# Patient Record
Sex: MALE | Race: ASIAN | NOT HISPANIC OR LATINO | URBAN - METROPOLITAN AREA
[De-identification: names, ages, dates, MRNs, and addresses within clinical notes are randomized per-mention and may not be internally consistent; named-entity substitution may affect disease eponyms.]

---

## 2019-01-01 ENCOUNTER — INPATIENT (INPATIENT)
Facility: HOSPITAL | Age: 84
LOS: 4 days | Discharge: HOME | DRG: 377 | End: 2019-10-01
Attending: FAMILY MEDICINE | Admitting: FAMILY MEDICINE
Payer: MEDICARE

## 2019-01-01 ENCOUNTER — INPATIENT (INPATIENT)
Facility: HOSPITAL | Age: 84
LOS: 3 days | DRG: 951 | End: 2019-10-05
Attending: FAMILY MEDICINE | Admitting: FAMILY MEDICINE
Payer: OTHER MISCELLANEOUS

## 2019-01-01 VITALS
OXYGEN SATURATION: 88 % | RESPIRATION RATE: 20 BRPM | WEIGHT: 119.93 LBS | HEART RATE: 114 BPM | SYSTOLIC BLOOD PRESSURE: 147 MMHG | HEIGHT: 67 IN | DIASTOLIC BLOOD PRESSURE: 78 MMHG

## 2019-01-01 VITALS
RESPIRATION RATE: 16 BRPM | SYSTOLIC BLOOD PRESSURE: 110 MMHG | DIASTOLIC BLOOD PRESSURE: 55 MMHG | TEMPERATURE: 98 F | HEART RATE: 113 BPM | OXYGEN SATURATION: 97 %

## 2019-01-01 VITALS
OXYGEN SATURATION: 100 % | TEMPERATURE: 98 F | SYSTOLIC BLOOD PRESSURE: 159 MMHG | RESPIRATION RATE: 17 BRPM | DIASTOLIC BLOOD PRESSURE: 70 MMHG | HEART RATE: 59 BPM

## 2019-01-01 VITALS
SYSTOLIC BLOOD PRESSURE: 153 MMHG | RESPIRATION RATE: 16 BRPM | OXYGEN SATURATION: 94 % | DIASTOLIC BLOOD PRESSURE: 69 MMHG | HEART RATE: 76 BPM | TEMPERATURE: 98 F

## 2019-01-01 DIAGNOSIS — R06.03 ACUTE RESPIRATORY DISTRESS: ICD-10-CM

## 2019-01-01 DIAGNOSIS — Z51.5 ENCOUNTER FOR PALLIATIVE CARE: ICD-10-CM

## 2019-01-01 DIAGNOSIS — K11.7 DISTURBANCES OF SALIVARY SECRETION: ICD-10-CM

## 2019-01-01 DIAGNOSIS — Z95.0 PRESENCE OF CARDIAC PACEMAKER: ICD-10-CM

## 2019-01-01 DIAGNOSIS — Z71.89 OTHER SPECIFIED COUNSELING: ICD-10-CM

## 2019-01-01 DIAGNOSIS — R53.2 FUNCTIONAL QUADRIPLEGIA: ICD-10-CM

## 2019-01-01 DIAGNOSIS — K92.2 GASTROINTESTINAL HEMORRHAGE, UNSPECIFIED: ICD-10-CM

## 2019-01-01 DIAGNOSIS — R74.8 ABNORMAL LEVELS OF OTHER SERUM ENZYMES: ICD-10-CM

## 2019-01-01 DIAGNOSIS — A41.9 SEPSIS, UNSPECIFIED ORGANISM: ICD-10-CM

## 2019-01-01 DIAGNOSIS — Z29.9 ENCOUNTER FOR PROPHYLACTIC MEASURES, UNSPECIFIED: ICD-10-CM

## 2019-01-01 DIAGNOSIS — R52 PAIN, UNSPECIFIED: ICD-10-CM

## 2019-01-01 DIAGNOSIS — I27.20 PULMONARY HYPERTENSION, UNSPECIFIED: ICD-10-CM

## 2019-01-01 DIAGNOSIS — E43 UNSPECIFIED SEVERE PROTEIN-CALORIE MALNUTRITION: ICD-10-CM

## 2019-01-01 DIAGNOSIS — R53.81 OTHER MALAISE: ICD-10-CM

## 2019-01-01 DIAGNOSIS — N17.9 ACUTE KIDNEY FAILURE, UNSPECIFIED: ICD-10-CM

## 2019-01-01 DIAGNOSIS — I27.21 SECONDARY PULMONARY ARTERIAL HYPERTENSION: ICD-10-CM

## 2019-01-01 DIAGNOSIS — G50.0 TRIGEMINAL NEURALGIA: ICD-10-CM

## 2019-01-01 DIAGNOSIS — D64.9 ANEMIA, UNSPECIFIED: ICD-10-CM

## 2019-01-01 LAB
ABO RH CONFIRMATION: SIGNIFICANT CHANGE UP
ACANTHOCYTES BLD QL SMEAR: PRESENT — SIGNIFICANT CHANGE UP
ALBUMIN SERPL ELPH-MCNC: 2.2 G/DL — LOW (ref 3.5–5)
ALBUMIN SERPL ELPH-MCNC: 2.8 G/DL — LOW (ref 3.5–5)
ALP SERPL-CCNC: 67 U/L — SIGNIFICANT CHANGE UP (ref 40–120)
ALP SERPL-CCNC: 81 U/L — SIGNIFICANT CHANGE UP (ref 40–120)
ALP SERPL-CCNC: 82 U/L — SIGNIFICANT CHANGE UP (ref 40–120)
ALP SERPL-CCNC: 86 U/L — SIGNIFICANT CHANGE UP (ref 40–120)
ALP SERPL-CCNC: 87 U/L — SIGNIFICANT CHANGE UP (ref 40–120)
ALT FLD-CCNC: 19 U/L DA — SIGNIFICANT CHANGE UP (ref 10–60)
ALT FLD-CCNC: 25 U/L DA — SIGNIFICANT CHANGE UP (ref 10–60)
ALT FLD-CCNC: 26 U/L DA — SIGNIFICANT CHANGE UP (ref 10–60)
ALT FLD-CCNC: 29 U/L DA — SIGNIFICANT CHANGE UP (ref 10–60)
ALT FLD-CCNC: 33 U/L DA — SIGNIFICANT CHANGE UP (ref 10–60)
ANION GAP SERPL CALC-SCNC: 12 MMOL/L — SIGNIFICANT CHANGE UP (ref 5–17)
ANION GAP SERPL CALC-SCNC: 12 MMOL/L — SIGNIFICANT CHANGE UP (ref 5–17)
ANION GAP SERPL CALC-SCNC: 13 MMOL/L — SIGNIFICANT CHANGE UP (ref 5–17)
ANION GAP SERPL CALC-SCNC: 13 MMOL/L — SIGNIFICANT CHANGE UP (ref 5–17)
ANION GAP SERPL CALC-SCNC: 7 MMOL/L — SIGNIFICANT CHANGE UP (ref 5–17)
ANION GAP SERPL CALC-SCNC: 7 MMOL/L — SIGNIFICANT CHANGE UP (ref 5–17)
ANISOCYTOSIS BLD QL: SLIGHT — SIGNIFICANT CHANGE UP
APPEARANCE UR: CLEAR — SIGNIFICANT CHANGE UP
APPEARANCE UR: CLEAR — SIGNIFICANT CHANGE UP
AST SERPL-CCNC: 31 U/L — SIGNIFICANT CHANGE UP (ref 10–40)
AST SERPL-CCNC: 33 U/L — SIGNIFICANT CHANGE UP (ref 10–40)
AST SERPL-CCNC: 34 U/L — SIGNIFICANT CHANGE UP (ref 10–40)
AST SERPL-CCNC: 48 U/L — HIGH (ref 10–40)
AST SERPL-CCNC: 53 U/L — HIGH (ref 10–40)
BASE EXCESS BLDV CALC-SCNC: -8.1 MMOL/L — LOW (ref -2–2)
BASOPHILS # BLD AUTO: 0 K/UL — SIGNIFICANT CHANGE UP (ref 0–0.2)
BASOPHILS # BLD AUTO: 0.01 K/UL — SIGNIFICANT CHANGE UP (ref 0–0.2)
BASOPHILS NFR BLD AUTO: 0 % — SIGNIFICANT CHANGE UP (ref 0–2)
BASOPHILS NFR BLD AUTO: 0.1 % — SIGNIFICANT CHANGE UP (ref 0–2)
BILIRUB SERPL-MCNC: 0.9 MG/DL — SIGNIFICANT CHANGE UP (ref 0.2–1.2)
BILIRUB SERPL-MCNC: 1 MG/DL — SIGNIFICANT CHANGE UP (ref 0.2–1.2)
BILIRUB SERPL-MCNC: 1.1 MG/DL — SIGNIFICANT CHANGE UP (ref 0.2–1.2)
BILIRUB SERPL-MCNC: 1.1 MG/DL — SIGNIFICANT CHANGE UP (ref 0.2–1.2)
BILIRUB SERPL-MCNC: 1.7 MG/DL — HIGH (ref 0.2–1.2)
BILIRUB UR-MCNC: NEGATIVE — SIGNIFICANT CHANGE UP
BILIRUB UR-MCNC: NEGATIVE — SIGNIFICANT CHANGE UP
BITE CELLS BLD QL SMEAR: PRESENT — SIGNIFICANT CHANGE UP
BLOOD GAS COMMENTS, VENOUS: SIGNIFICANT CHANGE UP
BUN SERPL-MCNC: 72 MG/DL — HIGH (ref 7–18)
BUN SERPL-MCNC: 84 MG/DL — HIGH (ref 7–18)
BUN SERPL-MCNC: 87 MG/DL — HIGH (ref 7–18)
BUN SERPL-MCNC: 88 MG/DL — HIGH (ref 7–18)
CALCIUM SERPL-MCNC: 7.6 MG/DL — LOW (ref 8.4–10.5)
CALCIUM SERPL-MCNC: 7.9 MG/DL — LOW (ref 8.4–10.5)
CALCIUM SERPL-MCNC: 8 MG/DL — LOW (ref 8.4–10.5)
CALCIUM SERPL-MCNC: 8.4 MG/DL — SIGNIFICANT CHANGE UP (ref 8.4–10.5)
CHLORIDE SERPL-SCNC: 113 MMOL/L — HIGH (ref 96–108)
CHLORIDE SERPL-SCNC: 116 MMOL/L — HIGH (ref 96–108)
CHLORIDE SERPL-SCNC: 117 MMOL/L — HIGH (ref 96–108)
CHLORIDE SERPL-SCNC: 118 MMOL/L — HIGH (ref 96–108)
CHLORIDE SERPL-SCNC: 119 MMOL/L — HIGH (ref 96–108)
CHLORIDE SERPL-SCNC: 120 MMOL/L — HIGH (ref 96–108)
CHOLEST SERPL-MCNC: 99 MG/DL — SIGNIFICANT CHANGE UP (ref 10–199)
CK MB BLD-MCNC: 2.5 % — SIGNIFICANT CHANGE UP (ref 0–3.5)
CK MB BLD-MCNC: 2.6 % — SIGNIFICANT CHANGE UP (ref 0–3.5)
CK MB CFR SERPL CALC: 5.7 NG/ML — HIGH (ref 0–3.6)
CK MB CFR SERPL CALC: 6.2 NG/ML — HIGH (ref 0–3.6)
CK SERPL-CCNC: 223 U/L — SIGNIFICANT CHANGE UP (ref 35–232)
CK SERPL-CCNC: 246 U/L — HIGH (ref 35–232)
CO2 SERPL-SCNC: 15 MMOL/L — LOW (ref 22–31)
CO2 SERPL-SCNC: 15 MMOL/L — LOW (ref 22–31)
CO2 SERPL-SCNC: 16 MMOL/L — LOW (ref 22–31)
CO2 SERPL-SCNC: 20 MMOL/L — LOW (ref 22–31)
CO2 SERPL-SCNC: 20 MMOL/L — LOW (ref 22–31)
CO2 SERPL-SCNC: 21 MMOL/L — LOW (ref 22–31)
COLOR SPEC: YELLOW — SIGNIFICANT CHANGE UP
COLOR SPEC: YELLOW — SIGNIFICANT CHANGE UP
CREAT ?TM UR-MCNC: 32 MG/DL — SIGNIFICANT CHANGE UP
CREAT SERPL-MCNC: 2.95 MG/DL — HIGH (ref 0.5–1.3)
CREAT SERPL-MCNC: 3.18 MG/DL — HIGH (ref 0.5–1.3)
CREAT SERPL-MCNC: 3.37 MG/DL — HIGH (ref 0.5–1.3)
CREAT SERPL-MCNC: 3.57 MG/DL — HIGH (ref 0.5–1.3)
CREAT SERPL-MCNC: 3.59 MG/DL — HIGH (ref 0.5–1.3)
CREAT SERPL-MCNC: 3.94 MG/DL — HIGH (ref 0.5–1.3)
CULTURE RESULTS: NO GROWTH — SIGNIFICANT CHANGE UP
CULTURE RESULTS: SIGNIFICANT CHANGE UP
CULTURE RESULTS: SIGNIFICANT CHANGE UP
DACRYOCYTES BLD QL SMEAR: SLIGHT — SIGNIFICANT CHANGE UP
DIFF PNL FLD: NEGATIVE — SIGNIFICANT CHANGE UP
DIFF PNL FLD: NEGATIVE — SIGNIFICANT CHANGE UP
ELLIPTOCYTES BLD QL SMEAR: SLIGHT — SIGNIFICANT CHANGE UP
EOSINOPHIL # BLD AUTO: 0 K/UL — SIGNIFICANT CHANGE UP (ref 0–0.5)
EOSINOPHIL # BLD AUTO: 0 K/UL — SIGNIFICANT CHANGE UP (ref 0–0.5)
EOSINOPHIL # BLD AUTO: 0.01 K/UL — SIGNIFICANT CHANGE UP (ref 0–0.5)
EOSINOPHIL # BLD AUTO: 0.03 K/UL — SIGNIFICANT CHANGE UP (ref 0–0.5)
EOSINOPHIL # BLD AUTO: 0.03 K/UL — SIGNIFICANT CHANGE UP (ref 0–0.5)
EOSINOPHIL NFR BLD AUTO: 0 % — SIGNIFICANT CHANGE UP (ref 0–6)
EOSINOPHIL NFR BLD AUTO: 0 % — SIGNIFICANT CHANGE UP (ref 0–6)
EOSINOPHIL NFR BLD AUTO: 0.1 % — SIGNIFICANT CHANGE UP (ref 0–6)
EOSINOPHIL NFR BLD AUTO: 0.3 % — SIGNIFICANT CHANGE UP (ref 0–6)
EOSINOPHIL NFR BLD AUTO: 0.3 % — SIGNIFICANT CHANGE UP (ref 0–6)
FERRITIN SERPL-MCNC: 751 NG/ML — HIGH (ref 30–400)
FOLATE SERPL-MCNC: 11.9 NG/ML — SIGNIFICANT CHANGE UP
GLUCOSE BLDC GLUCOMTR-MCNC: 107 MG/DL — HIGH (ref 70–99)
GLUCOSE BLDC GLUCOMTR-MCNC: 108 MG/DL — HIGH (ref 70–99)
GLUCOSE BLDC GLUCOMTR-MCNC: 140 MG/DL — HIGH (ref 70–99)
GLUCOSE BLDC GLUCOMTR-MCNC: 154 MG/DL — HIGH (ref 70–99)
GLUCOSE BLDC GLUCOMTR-MCNC: 168 MG/DL — HIGH (ref 70–99)
GLUCOSE BLDC GLUCOMTR-MCNC: 19 MG/DL — CRITICAL LOW (ref 70–99)
GLUCOSE BLDC GLUCOMTR-MCNC: 227 MG/DL — HIGH (ref 70–99)
GLUCOSE BLDC GLUCOMTR-MCNC: 27 MG/DL — CRITICAL LOW (ref 70–99)
GLUCOSE BLDC GLUCOMTR-MCNC: 30 MG/DL — CRITICAL LOW (ref 70–99)
GLUCOSE BLDC GLUCOMTR-MCNC: 31 MG/DL — CRITICAL LOW (ref 70–99)
GLUCOSE BLDC GLUCOMTR-MCNC: 42 MG/DL — CRITICAL LOW (ref 70–99)
GLUCOSE BLDC GLUCOMTR-MCNC: 49 MG/DL — LOW (ref 70–99)
GLUCOSE BLDC GLUCOMTR-MCNC: 73 MG/DL — SIGNIFICANT CHANGE UP (ref 70–99)
GLUCOSE BLDC GLUCOMTR-MCNC: 74 MG/DL — SIGNIFICANT CHANGE UP (ref 70–99)
GLUCOSE BLDC GLUCOMTR-MCNC: 84 MG/DL — SIGNIFICANT CHANGE UP (ref 70–99)
GLUCOSE SERPL-MCNC: 102 MG/DL — HIGH (ref 70–99)
GLUCOSE SERPL-MCNC: 108 MG/DL — HIGH (ref 70–99)
GLUCOSE SERPL-MCNC: 141 MG/DL — HIGH (ref 70–99)
GLUCOSE SERPL-MCNC: 177 MG/DL — HIGH (ref 70–99)
GLUCOSE SERPL-MCNC: 66 MG/DL — LOW (ref 70–99)
GLUCOSE SERPL-MCNC: 76 MG/DL — SIGNIFICANT CHANGE UP (ref 70–99)
GLUCOSE UR QL: NEGATIVE — SIGNIFICANT CHANGE UP
GLUCOSE UR QL: NEGATIVE — SIGNIFICANT CHANGE UP
HBA1C BLD-MCNC: 5 % — SIGNIFICANT CHANGE UP (ref 4–5.6)
HCO3 BLDV-SCNC: 18 MMOL/L — LOW (ref 21–29)
HCT VFR BLD CALC: 22.6 % — LOW (ref 39–50)
HCT VFR BLD CALC: 23.3 % — LOW (ref 39–50)
HCT VFR BLD CALC: 28.6 % — LOW (ref 39–50)
HCT VFR BLD CALC: 29 % — LOW (ref 39–50)
HCT VFR BLD CALC: 29.3 % — LOW (ref 39–50)
HCT VFR BLD CALC: 29.4 % — LOW (ref 39–50)
HCT VFR BLD CALC: 30.1 % — LOW (ref 39–50)
HDLC SERPL-MCNC: 28 MG/DL — LOW
HGB BLD-MCNC: 6.7 G/DL — CRITICAL LOW (ref 13–17)
HGB BLD-MCNC: 7 G/DL — CRITICAL LOW (ref 13–17)
HGB BLD-MCNC: 8.9 G/DL — LOW (ref 13–17)
HGB BLD-MCNC: 8.9 G/DL — LOW (ref 13–17)
HGB BLD-MCNC: 9 G/DL — LOW (ref 13–17)
HGB BLD-MCNC: 9 G/DL — LOW (ref 13–17)
HGB BLD-MCNC: 9.3 G/DL — LOW (ref 13–17)
HOROWITZ INDEX BLDV+IHG-RTO: 21 — SIGNIFICANT CHANGE UP
HYPOCHROMIA BLD QL: SLIGHT — SIGNIFICANT CHANGE UP
IMM GRANULOCYTES NFR BLD AUTO: 0.8 % — SIGNIFICANT CHANGE UP (ref 0–1.5)
IMM GRANULOCYTES NFR BLD AUTO: 1.1 % — SIGNIFICANT CHANGE UP (ref 0–1.5)
IMM GRANULOCYTES NFR BLD AUTO: 1.1 % — SIGNIFICANT CHANGE UP (ref 0–1.5)
IMM GRANULOCYTES NFR BLD AUTO: 1.6 % — HIGH (ref 0–1.5)
IRON SATN MFR SERPL: 58 % — HIGH (ref 20–55)
IRON SATN MFR SERPL: 98 UG/DL — SIGNIFICANT CHANGE UP (ref 65–170)
KETONES UR-MCNC: NEGATIVE — SIGNIFICANT CHANGE UP
KETONES UR-MCNC: NEGATIVE — SIGNIFICANT CHANGE UP
LACTATE SERPL-SCNC: 2.8 MMOL/L — HIGH (ref 0.7–2)
LACTATE SERPL-SCNC: 3.2 MMOL/L — HIGH (ref 0.7–2)
LEUKOCYTE ESTERASE UR-ACNC: NEGATIVE — SIGNIFICANT CHANGE UP
LEUKOCYTE ESTERASE UR-ACNC: NEGATIVE — SIGNIFICANT CHANGE UP
LIPID PNL WITH DIRECT LDL SERPL: 43 MG/DL — SIGNIFICANT CHANGE UP
LYMPHOCYTES # BLD AUTO: 0.2 K/UL — LOW (ref 1–3.3)
LYMPHOCYTES # BLD AUTO: 0.3 K/UL — LOW (ref 1–3.3)
LYMPHOCYTES # BLD AUTO: 0.33 K/UL — LOW (ref 1–3.3)
LYMPHOCYTES # BLD AUTO: 0.52 K/UL — LOW (ref 1–3.3)
LYMPHOCYTES # BLD AUTO: 0.85 K/UL — LOW (ref 1–3.3)
LYMPHOCYTES # BLD AUTO: 1.6 % — LOW (ref 13–44)
LYMPHOCYTES # BLD AUTO: 2.7 % — LOW (ref 13–44)
LYMPHOCYTES # BLD AUTO: 3.1 % — LOW (ref 13–44)
LYMPHOCYTES # BLD AUTO: 4.3 % — LOW (ref 13–44)
LYMPHOCYTES # BLD AUTO: 7 % — LOW (ref 13–44)
MACROCYTES BLD QL: SLIGHT — SIGNIFICANT CHANGE UP
MACROCYTES OVAL BLD QL SMEAR: PRESENT — SIGNIFICANT CHANGE UP
MAGNESIUM SERPL-MCNC: 2.6 MG/DL — SIGNIFICANT CHANGE UP (ref 1.6–2.6)
MAGNESIUM SERPL-MCNC: 2.8 MG/DL — HIGH (ref 1.6–2.6)
MAGNESIUM SERPL-MCNC: 2.9 MG/DL — HIGH (ref 1.6–2.6)
MANUAL SMEAR VERIFICATION: SIGNIFICANT CHANGE UP
MANUAL SMEAR VERIFICATION: SIGNIFICANT CHANGE UP
MCHC RBC-ENTMCNC: 27.8 PG — SIGNIFICANT CHANGE UP (ref 27–34)
MCHC RBC-ENTMCNC: 27.9 PG — SIGNIFICANT CHANGE UP (ref 27–34)
MCHC RBC-ENTMCNC: 28.4 PG — SIGNIFICANT CHANGE UP (ref 27–34)
MCHC RBC-ENTMCNC: 28.6 PG — SIGNIFICANT CHANGE UP (ref 27–34)
MCHC RBC-ENTMCNC: 29 PG — SIGNIFICANT CHANGE UP (ref 27–34)
MCHC RBC-ENTMCNC: 29.6 GM/DL — LOW (ref 32–36)
MCHC RBC-ENTMCNC: 30 GM/DL — LOW (ref 32–36)
MCHC RBC-ENTMCNC: 30.4 GM/DL — LOW (ref 32–36)
MCHC RBC-ENTMCNC: 30.6 GM/DL — LOW (ref 32–36)
MCHC RBC-ENTMCNC: 30.9 GM/DL — LOW (ref 32–36)
MCHC RBC-ENTMCNC: 31 GM/DL — LOW (ref 32–36)
MCHC RBC-ENTMCNC: 31.1 GM/DL — LOW (ref 32–36)
MCV RBC AUTO: 92.1 FL — SIGNIFICANT CHANGE UP (ref 80–100)
MCV RBC AUTO: 92.6 FL — SIGNIFICANT CHANGE UP (ref 80–100)
MCV RBC AUTO: 92.7 FL — SIGNIFICANT CHANGE UP (ref 80–100)
MCV RBC AUTO: 92.8 FL — SIGNIFICANT CHANGE UP (ref 80–100)
MCV RBC AUTO: 93.2 FL — SIGNIFICANT CHANGE UP (ref 80–100)
MCV RBC AUTO: 93.8 FL — SIGNIFICANT CHANGE UP (ref 80–100)
MCV RBC AUTO: 94.2 FL — SIGNIFICANT CHANGE UP (ref 80–100)
METAMYELOCYTES # FLD: 1 % — HIGH (ref 0–0)
MONOCYTES # BLD AUTO: 0.61 K/UL — SIGNIFICANT CHANGE UP (ref 0–0.9)
MONOCYTES # BLD AUTO: 0.62 K/UL — SIGNIFICANT CHANGE UP (ref 0–0.9)
MONOCYTES # BLD AUTO: 0.73 K/UL — SIGNIFICANT CHANGE UP (ref 0–0.9)
MONOCYTES # BLD AUTO: 0.78 K/UL — SIGNIFICANT CHANGE UP (ref 0–0.9)
MONOCYTES # BLD AUTO: 1.1 K/UL — HIGH (ref 0–0.9)
MONOCYTES NFR BLD AUTO: 5 % — SIGNIFICANT CHANGE UP (ref 2–14)
MONOCYTES NFR BLD AUTO: 5.7 % — SIGNIFICANT CHANGE UP (ref 2–14)
MONOCYTES NFR BLD AUTO: 6.3 % — SIGNIFICANT CHANGE UP (ref 2–14)
MONOCYTES NFR BLD AUTO: 6.9 % — SIGNIFICANT CHANGE UP (ref 2–14)
MONOCYTES NFR BLD AUTO: 9.1 % — SIGNIFICANT CHANGE UP (ref 2–14)
MYELOCYTES NFR BLD: 1 % — HIGH (ref 0–0)
NEUTROPHILS # BLD AUTO: 10.36 K/UL — HIGH (ref 1.8–7.4)
NEUTROPHILS # BLD AUTO: 10.54 K/UL — HIGH (ref 1.8–7.4)
NEUTROPHILS # BLD AUTO: 11.15 K/UL — HIGH (ref 1.8–7.4)
NEUTROPHILS # BLD AUTO: 9.4 K/UL — HIGH (ref 1.8–7.4)
NEUTROPHILS # BLD AUTO: 9.87 K/UL — HIGH (ref 1.8–7.4)
NEUTROPHILS NFR BLD AUTO: 85.4 % — HIGH (ref 43–77)
NEUTROPHILS NFR BLD AUTO: 87 % — HIGH (ref 43–77)
NEUTROPHILS NFR BLD AUTO: 88.8 % — HIGH (ref 43–77)
NEUTROPHILS NFR BLD AUTO: 90.1 % — HIGH (ref 43–77)
NEUTROPHILS NFR BLD AUTO: 90.3 % — HIGH (ref 43–77)
NITRITE UR-MCNC: NEGATIVE — SIGNIFICANT CHANGE UP
NITRITE UR-MCNC: NEGATIVE — SIGNIFICANT CHANGE UP
NRBC # BLD: 11 /100 WBCS — HIGH (ref 0–0)
NRBC # BLD: 13 /100 — HIGH (ref 0–0)
NRBC # BLD: 16 /100 WBCS — HIGH (ref 0–0)
NRBC # BLD: 17 /100 WBCS — HIGH (ref 0–0)
NRBC # BLD: 3 /100 WBCS — HIGH (ref 0–0)
NRBC # BLD: 6 /100 WBCS — HIGH (ref 0–0)
OB PNL STL: POSITIVE
OSMOLALITY UR: 362 MOS/KG — SIGNIFICANT CHANGE UP (ref 50–1200)
OVALOCYTES BLD QL SMEAR: SLIGHT — SIGNIFICANT CHANGE UP
PCO2 BLDV: 44 MMHG — SIGNIFICANT CHANGE UP (ref 35–50)
PH BLDV: 7.24 — LOW (ref 7.35–7.45)
PH UR: 5 — SIGNIFICANT CHANGE UP (ref 5–8)
PH UR: 5 — SIGNIFICANT CHANGE UP (ref 5–8)
PHOSPHATE SERPL-MCNC: 4.1 MG/DL — SIGNIFICANT CHANGE UP (ref 2.5–4.5)
PHOSPHATE SERPL-MCNC: 6.3 MG/DL — HIGH (ref 2.5–4.5)
PHOSPHATE SERPL-MCNC: 6.7 MG/DL — HIGH (ref 2.5–4.5)
PLAT MORPH BLD: NORMAL — SIGNIFICANT CHANGE UP
PLAT MORPH BLD: NORMAL — SIGNIFICANT CHANGE UP
PLATELET # BLD AUTO: 105 K/UL — LOW (ref 150–400)
PLATELET # BLD AUTO: 106 K/UL — LOW (ref 150–400)
PLATELET # BLD AUTO: 127 K/UL — LOW (ref 150–400)
PLATELET # BLD AUTO: 140 K/UL — LOW (ref 150–400)
PLATELET # BLD AUTO: 68 K/UL — LOW (ref 150–400)
PLATELET # BLD AUTO: 78 K/UL — LOW (ref 150–400)
PLATELET # BLD AUTO: 87 K/UL — LOW (ref 150–400)
PLATELET COUNT - ESTIMATE: ABNORMAL
PO2 BLDV: 32 MMHG — SIGNIFICANT CHANGE UP (ref 25–45)
POIKILOCYTOSIS BLD QL AUTO: SLIGHT — SIGNIFICANT CHANGE UP
POLYCHROMASIA BLD QL SMEAR: SLIGHT — SIGNIFICANT CHANGE UP
POTASSIUM SERPL-MCNC: 3.5 MMOL/L — SIGNIFICANT CHANGE UP (ref 3.5–5.3)
POTASSIUM SERPL-MCNC: 3.9 MMOL/L — SIGNIFICANT CHANGE UP (ref 3.5–5.3)
POTASSIUM SERPL-MCNC: 4.2 MMOL/L — SIGNIFICANT CHANGE UP (ref 3.5–5.3)
POTASSIUM SERPL-MCNC: 4.6 MMOL/L — SIGNIFICANT CHANGE UP (ref 3.5–5.3)
POTASSIUM SERPL-MCNC: 4.6 MMOL/L — SIGNIFICANT CHANGE UP (ref 3.5–5.3)
POTASSIUM SERPL-MCNC: 5.6 MMOL/L — HIGH (ref 3.5–5.3)
POTASSIUM SERPL-SCNC: 3.5 MMOL/L — SIGNIFICANT CHANGE UP (ref 3.5–5.3)
POTASSIUM SERPL-SCNC: 3.9 MMOL/L — SIGNIFICANT CHANGE UP (ref 3.5–5.3)
POTASSIUM SERPL-SCNC: 4.2 MMOL/L — SIGNIFICANT CHANGE UP (ref 3.5–5.3)
POTASSIUM SERPL-SCNC: 4.6 MMOL/L — SIGNIFICANT CHANGE UP (ref 3.5–5.3)
POTASSIUM SERPL-SCNC: 4.6 MMOL/L — SIGNIFICANT CHANGE UP (ref 3.5–5.3)
POTASSIUM SERPL-SCNC: 5.6 MMOL/L — HIGH (ref 3.5–5.3)
PROT ?TM UR-MCNC: 24 MG/DL — HIGH (ref 0–12)
PROT SERPL-MCNC: 4.8 G/DL — LOW (ref 6–8.3)
PROT SERPL-MCNC: 5.5 G/DL — LOW (ref 6–8.3)
PROT UR-MCNC: 15
PROT UR-MCNC: 30 MG/DL
RBC # BLD: 2.41 M/UL — LOW (ref 4.2–5.8)
RBC # BLD: 2.51 M/UL — LOW (ref 4.2–5.8)
RBC # BLD: 3.07 M/UL — LOW (ref 4.2–5.8)
RBC # BLD: 3.11 M/UL — LOW (ref 4.2–5.8)
RBC # BLD: 3.15 M/UL — LOW (ref 4.2–5.8)
RBC # BLD: 3.17 M/UL — LOW (ref 4.2–5.8)
RBC # BLD: 3.25 M/UL — LOW (ref 4.2–5.8)
RBC # FLD: 19.5 % — HIGH (ref 10.3–14.5)
RBC # FLD: 20.2 % — HIGH (ref 10.3–14.5)
RBC # FLD: 20.8 % — HIGH (ref 10.3–14.5)
RBC # FLD: 21.5 % — HIGH (ref 10.3–14.5)
RBC # FLD: 21.8 % — HIGH (ref 10.3–14.5)
RBC # FLD: 22.3 % — HIGH (ref 10.3–14.5)
RBC # FLD: 22.5 % — HIGH (ref 10.3–14.5)
RBC BLD AUTO: ABNORMAL
RBC BLD AUTO: SIGNIFICANT CHANGE UP
RBC CASTS # UR COMP ASSIST: SIGNIFICANT CHANGE UP /HPF (ref 0–2)
SAO2 % BLDV: 38 % — LOW (ref 67–88)
SCHISTOCYTES BLD QL AUTO: SLIGHT — SIGNIFICANT CHANGE UP
SODIUM SERPL-SCNC: 144 MMOL/L — SIGNIFICANT CHANGE UP (ref 135–145)
SODIUM SERPL-SCNC: 145 MMOL/L — SIGNIFICANT CHANGE UP (ref 135–145)
SODIUM SERPL-SCNC: 147 MMOL/L — HIGH (ref 135–145)
SODIUM SERPL-SCNC: 148 MMOL/L — HIGH (ref 135–145)
SODIUM UR-SCNC: 76 MMOL/L — SIGNIFICANT CHANGE UP (ref 40–220)
SP GR SPEC: 1.01 — SIGNIFICANT CHANGE UP (ref 1.01–1.02)
SP GR SPEC: 1.02 — SIGNIFICANT CHANGE UP (ref 1.01–1.02)
SPECIMEN SOURCE: SIGNIFICANT CHANGE UP
TIBC SERPL-MCNC: 169 UG/DL — LOW (ref 250–450)
TOTAL CHOLESTEROL/HDL RATIO MEASUREMENT: 3.5 RATIO — SIGNIFICANT CHANGE UP (ref 3.4–9.6)
TRANSFERRIN SERPL-MCNC: 130 MG/DL — LOW (ref 200–360)
TRIGL SERPL-MCNC: 139 MG/DL — SIGNIFICANT CHANGE UP (ref 10–149)
TROPONIN I SERPL-MCNC: 0.09 NG/ML — HIGH (ref 0–0.04)
TROPONIN I SERPL-MCNC: 0.09 NG/ML — HIGH (ref 0–0.04)
TSH SERPL-MCNC: 3.14 UU/ML — SIGNIFICANT CHANGE UP (ref 0.34–4.82)
UIBC SERPL-MCNC: 71 UG/DL — LOW (ref 110–370)
UROBILINOGEN FLD QL: NEGATIVE — SIGNIFICANT CHANGE UP
UROBILINOGEN FLD QL: NEGATIVE — SIGNIFICANT CHANGE UP
VIT B12 SERPL-MCNC: >2000 PG/ML — HIGH (ref 232–1245)
WBC # BLD: 10.58 K/UL — HIGH (ref 3.8–10.5)
WBC # BLD: 10.95 K/UL — HIGH (ref 3.8–10.5)
WBC # BLD: 11.46 K/UL — HIGH (ref 3.8–10.5)
WBC # BLD: 12.11 K/UL — HIGH (ref 3.8–10.5)
WBC # BLD: 12.12 K/UL — HIGH (ref 3.8–10.5)
WBC # BLD: 12.35 K/UL — HIGH (ref 3.8–10.5)
WBC # BLD: 13.7 K/UL — HIGH (ref 3.8–10.5)
WBC # FLD AUTO: 10.58 K/UL — HIGH (ref 3.8–10.5)
WBC # FLD AUTO: 10.95 K/UL — HIGH (ref 3.8–10.5)
WBC # FLD AUTO: 11.46 K/UL — HIGH (ref 3.8–10.5)
WBC # FLD AUTO: 12.11 K/UL — HIGH (ref 3.8–10.5)
WBC # FLD AUTO: 12.12 K/UL — HIGH (ref 3.8–10.5)
WBC # FLD AUTO: 12.35 K/UL — HIGH (ref 3.8–10.5)
WBC # FLD AUTO: 13.7 K/UL — HIGH (ref 3.8–10.5)
WBC UR QL: SIGNIFICANT CHANGE UP /HPF (ref 0–5)

## 2019-01-01 PROCEDURE — 99285 EMERGENCY DEPT VISIT HI MDM: CPT

## 2019-01-01 PROCEDURE — 99233 SBSQ HOSP IP/OBS HIGH 50: CPT

## 2019-01-01 PROCEDURE — 99232 SBSQ HOSP IP/OBS MODERATE 35: CPT

## 2019-01-01 PROCEDURE — 71045 X-RAY EXAM CHEST 1 VIEW: CPT | Mod: 26

## 2019-01-01 PROCEDURE — 76775 US EXAM ABDO BACK WALL LIM: CPT | Mod: 26

## 2019-01-01 PROCEDURE — 70450 CT HEAD/BRAIN W/O DYE: CPT | Mod: 26

## 2019-01-01 RX ORDER — SODIUM CHLORIDE 9 MG/ML
1000 INJECTION, SOLUTION INTRAVENOUS
Refills: 0 | Status: DISCONTINUED | OUTPATIENT
Start: 2019-01-01 | End: 2019-01-01

## 2019-01-01 RX ORDER — DEXTROSE 50 % IN WATER 50 %
15 SYRINGE (ML) INTRAVENOUS ONCE
Refills: 0 | Status: DISCONTINUED | OUTPATIENT
Start: 2019-01-01 | End: 2019-01-01

## 2019-01-01 RX ORDER — FUROSEMIDE 40 MG
40 TABLET ORAL ONCE
Refills: 0 | Status: COMPLETED | OUTPATIENT
Start: 2019-01-01 | End: 2019-01-01

## 2019-01-01 RX ORDER — CARBAMAZEPINE 200 MG
200 TABLET ORAL DAILY
Refills: 0 | Status: DISCONTINUED | OUTPATIENT
Start: 2019-01-01 | End: 2019-01-01

## 2019-01-01 RX ORDER — DEXTROSE 50 % IN WATER 50 %
25 SYRINGE (ML) INTRAVENOUS ONCE
Refills: 0 | Status: DISCONTINUED | OUTPATIENT
Start: 2019-01-01 | End: 2019-01-01

## 2019-01-01 RX ORDER — MORPHINE SULFATE 50 MG/1
1 CAPSULE, EXTENDED RELEASE ORAL
Refills: 0 | Status: DISCONTINUED | OUTPATIENT
Start: 2019-01-01 | End: 2019-01-01

## 2019-01-01 RX ORDER — TRAMADOL HYDROCHLORIDE 50 MG/1
25 TABLET ORAL EVERY 8 HOURS
Refills: 0 | Status: DISCONTINUED | OUTPATIENT
Start: 2019-01-01 | End: 2019-01-01

## 2019-01-01 RX ORDER — DEXTROSE 50 % IN WATER 50 %
50 SYRINGE (ML) INTRAVENOUS ONCE
Refills: 0 | Status: COMPLETED | OUTPATIENT
Start: 2019-01-01 | End: 2019-01-01

## 2019-01-01 RX ORDER — CARBAMAZEPINE 200 MG
1 TABLET ORAL
Qty: 0 | Refills: 0 | DISCHARGE

## 2019-01-01 RX ORDER — LAMOTRIGINE 25 MG/1
2 TABLET, ORALLY DISINTEGRATING ORAL
Qty: 0 | Refills: 0 | DISCHARGE

## 2019-01-01 RX ORDER — ACETAMINOPHEN 500 MG
650 TABLET ORAL EVERY 6 HOURS
Refills: 0 | Status: DISCONTINUED | OUTPATIENT
Start: 2019-01-01 | End: 2019-01-01

## 2019-01-01 RX ORDER — AMLODIPINE BESYLATE 2.5 MG/1
5 TABLET ORAL DAILY
Refills: 0 | Status: DISCONTINUED | OUTPATIENT
Start: 2019-01-01 | End: 2019-01-01

## 2019-01-01 RX ORDER — SODIUM CHLORIDE 9 MG/ML
1000 INJECTION INTRAMUSCULAR; INTRAVENOUS; SUBCUTANEOUS
Refills: 0 | Status: DISCONTINUED | OUTPATIENT
Start: 2019-01-01 | End: 2019-01-01

## 2019-01-01 RX ORDER — SODIUM CHLORIDE 9 MG/ML
1000 INJECTION INTRAMUSCULAR; INTRAVENOUS; SUBCUTANEOUS ONCE
Refills: 0 | Status: COMPLETED | OUTPATIENT
Start: 2019-01-01 | End: 2019-01-01

## 2019-01-01 RX ORDER — PIPERACILLIN AND TAZOBACTAM 4; .5 G/20ML; G/20ML
3.38 INJECTION, POWDER, LYOPHILIZED, FOR SOLUTION INTRAVENOUS ONCE
Refills: 0 | Status: COMPLETED | OUTPATIENT
Start: 2019-01-01 | End: 2019-01-01

## 2019-01-01 RX ORDER — GLUCAGON INJECTION, SOLUTION 0.5 MG/.1ML
1 INJECTION, SOLUTION SUBCUTANEOUS ONCE
Refills: 0 | Status: COMPLETED | OUTPATIENT
Start: 2019-01-01 | End: 2019-01-01

## 2019-01-01 RX ORDER — DEXTROSE 10 % IN WATER 10 %
250 INTRAVENOUS SOLUTION INTRAVENOUS
Refills: 0 | Status: COMPLETED | OUTPATIENT
Start: 2019-01-01 | End: 2019-01-01

## 2019-01-01 RX ORDER — LAMOTRIGINE 25 MG/1
50 TABLET, ORALLY DISINTEGRATING ORAL
Refills: 0 | Status: DISCONTINUED | OUTPATIENT
Start: 2019-01-01 | End: 2019-01-01

## 2019-01-01 RX ORDER — GLUCAGON INJECTION, SOLUTION 0.5 MG/.1ML
1 INJECTION, SOLUTION SUBCUTANEOUS ONCE
Refills: 0 | Status: DISCONTINUED | OUTPATIENT
Start: 2019-01-01 | End: 2019-01-01

## 2019-01-01 RX ORDER — MORPHINE SULFATE 50 MG/1
2 CAPSULE, EXTENDED RELEASE ORAL
Refills: 0 | Status: DISCONTINUED | OUTPATIENT
Start: 2019-01-01 | End: 2019-01-01

## 2019-01-01 RX ORDER — DEXTROSE 10 % IN WATER 10 %
250 INTRAVENOUS SOLUTION INTRAVENOUS
Refills: 0 | Status: DISCONTINUED | OUTPATIENT
Start: 2019-01-01 | End: 2019-01-01

## 2019-01-01 RX ORDER — PANTOPRAZOLE SODIUM 20 MG/1
40 TABLET, DELAYED RELEASE ORAL EVERY 12 HOURS
Refills: 0 | Status: DISCONTINUED | OUTPATIENT
Start: 2019-01-01 | End: 2019-01-01

## 2019-01-01 RX ORDER — DEXTROSE 50 % IN WATER 50 %
12.5 SYRINGE (ML) INTRAVENOUS ONCE
Refills: 0 | Status: COMPLETED | OUTPATIENT
Start: 2019-01-01 | End: 2019-01-01

## 2019-01-01 RX ORDER — RANOLAZINE 500 MG/1
1 TABLET, FILM COATED, EXTENDED RELEASE ORAL
Qty: 0 | Refills: 0 | DISCHARGE

## 2019-01-01 RX ORDER — ROBINUL 0.2 MG/ML
0.4 INJECTION INTRAMUSCULAR; INTRAVENOUS EVERY 6 HOURS
Refills: 0 | Status: DISCONTINUED | OUTPATIENT
Start: 2019-01-01 | End: 2019-01-01

## 2019-01-01 RX ORDER — DEXTROSE 50 % IN WATER 50 %
12.5 SYRINGE (ML) INTRAVENOUS ONCE
Refills: 0 | Status: DISCONTINUED | OUTPATIENT
Start: 2019-01-01 | End: 2019-01-01

## 2019-01-01 RX ORDER — SODIUM POLYSTYRENE SULFONATE 4.1 MEQ/G
30 POWDER, FOR SUSPENSION ORAL ONCE
Refills: 0 | Status: COMPLETED | OUTPATIENT
Start: 2019-01-01 | End: 2019-01-01

## 2019-01-01 RX ORDER — METOPROLOL TARTRATE 50 MG
50 TABLET ORAL DAILY
Refills: 0 | Status: DISCONTINUED | OUTPATIENT
Start: 2019-01-01 | End: 2019-01-01

## 2019-01-01 RX ORDER — ROBINUL 0.2 MG/ML
0.4 INJECTION INTRAMUSCULAR; INTRAVENOUS EVERY 4 HOURS
Refills: 0 | Status: DISCONTINUED | OUTPATIENT
Start: 2019-01-01 | End: 2019-01-01

## 2019-01-01 RX ADMIN — LAMOTRIGINE 50 MILLIGRAM(S): 25 TABLET, ORALLY DISINTEGRATING ORAL at 06:34

## 2019-01-01 RX ADMIN — TRAMADOL HYDROCHLORIDE 25 MILLIGRAM(S): 50 TABLET ORAL at 07:09

## 2019-01-01 RX ADMIN — Medication 50 MILLILITER(S): at 03:26

## 2019-01-01 RX ADMIN — MORPHINE SULFATE 2 MILLIGRAM(S): 50 CAPSULE, EXTENDED RELEASE ORAL at 12:43

## 2019-01-01 RX ADMIN — Medication 12.5 GRAM(S): at 00:42

## 2019-01-01 RX ADMIN — GLUCAGON INJECTION, SOLUTION 1 MILLIGRAM(S): 0.5 INJECTION, SOLUTION SUBCUTANEOUS at 00:41

## 2019-01-01 RX ADMIN — Medication 200 MILLIGRAM(S): at 12:14

## 2019-01-01 RX ADMIN — TRAMADOL HYDROCHLORIDE 25 MILLIGRAM(S): 50 TABLET ORAL at 07:53

## 2019-01-01 RX ADMIN — TRAMADOL HYDROCHLORIDE 25 MILLIGRAM(S): 50 TABLET ORAL at 23:54

## 2019-01-01 RX ADMIN — LAMOTRIGINE 50 MILLIGRAM(S): 25 TABLET, ORALLY DISINTEGRATING ORAL at 22:01

## 2019-01-01 RX ADMIN — LAMOTRIGINE 50 MILLIGRAM(S): 25 TABLET, ORALLY DISINTEGRATING ORAL at 22:21

## 2019-01-01 RX ADMIN — PANTOPRAZOLE SODIUM 40 MILLIGRAM(S): 20 TABLET, DELAYED RELEASE ORAL at 17:47

## 2019-01-01 RX ADMIN — GLUCAGON INJECTION, SOLUTION 1 MILLIGRAM(S): 0.5 INJECTION, SOLUTION SUBCUTANEOUS at 10:23

## 2019-01-01 RX ADMIN — MORPHINE SULFATE 1 MILLIGRAM(S): 50 CAPSULE, EXTENDED RELEASE ORAL at 13:25

## 2019-01-01 RX ADMIN — SODIUM CHLORIDE 1000 MILLILITER(S): 9 INJECTION INTRAMUSCULAR; INTRAVENOUS; SUBCUTANEOUS at 13:13

## 2019-01-01 RX ADMIN — MORPHINE SULFATE 2 MILLIGRAM(S): 50 CAPSULE, EXTENDED RELEASE ORAL at 04:11

## 2019-01-01 RX ADMIN — SODIUM CHLORIDE 50 MILLILITER(S): 9 INJECTION INTRAMUSCULAR; INTRAVENOUS; SUBCUTANEOUS at 18:24

## 2019-01-01 RX ADMIN — PANTOPRAZOLE SODIUM 40 MILLIGRAM(S): 20 TABLET, DELAYED RELEASE ORAL at 18:24

## 2019-01-01 RX ADMIN — PANTOPRAZOLE SODIUM 40 MILLIGRAM(S): 20 TABLET, DELAYED RELEASE ORAL at 07:00

## 2019-01-01 RX ADMIN — Medication 200 MILLIGRAM(S): at 11:29

## 2019-01-01 RX ADMIN — Medication 650 MILLIGRAM(S): at 13:28

## 2019-01-01 RX ADMIN — Medication 50 MILLIGRAM(S): at 06:34

## 2019-01-01 RX ADMIN — AMLODIPINE BESYLATE 5 MILLIGRAM(S): 2.5 TABLET ORAL at 12:14

## 2019-01-01 RX ADMIN — ROBINUL 0.4 MILLIGRAM(S): 0.2 INJECTION INTRAMUSCULAR; INTRAVENOUS at 13:14

## 2019-01-01 RX ADMIN — PIPERACILLIN AND TAZOBACTAM 200 GRAM(S): 4; .5 INJECTION, POWDER, LYOPHILIZED, FOR SOLUTION INTRAVENOUS at 15:15

## 2019-01-01 RX ADMIN — LAMOTRIGINE 50 MILLIGRAM(S): 25 TABLET, ORALLY DISINTEGRATING ORAL at 07:07

## 2019-01-01 RX ADMIN — MORPHINE SULFATE 2 MILLIGRAM(S): 50 CAPSULE, EXTENDED RELEASE ORAL at 04:26

## 2019-01-01 RX ADMIN — PANTOPRAZOLE SODIUM 40 MILLIGRAM(S): 20 TABLET, DELAYED RELEASE ORAL at 18:37

## 2019-01-01 RX ADMIN — MORPHINE SULFATE 1 MILLIGRAM(S): 50 CAPSULE, EXTENDED RELEASE ORAL at 23:47

## 2019-01-01 RX ADMIN — ROBINUL 0.4 MILLIGRAM(S): 0.2 INJECTION INTRAMUSCULAR; INTRAVENOUS at 04:11

## 2019-01-01 RX ADMIN — MORPHINE SULFATE 2 MILLIGRAM(S): 50 CAPSULE, EXTENDED RELEASE ORAL at 15:59

## 2019-01-01 RX ADMIN — TRAMADOL HYDROCHLORIDE 25 MILLIGRAM(S): 50 TABLET ORAL at 22:45

## 2019-01-01 RX ADMIN — ROBINUL 0.4 MILLIGRAM(S): 0.2 INJECTION INTRAMUSCULAR; INTRAVENOUS at 12:35

## 2019-01-01 RX ADMIN — Medication 50 MILLIGRAM(S): at 07:07

## 2019-01-01 RX ADMIN — PANTOPRAZOLE SODIUM 40 MILLIGRAM(S): 20 TABLET, DELAYED RELEASE ORAL at 07:07

## 2019-01-01 RX ADMIN — MORPHINE SULFATE 2 MILLIGRAM(S): 50 CAPSULE, EXTENDED RELEASE ORAL at 15:57

## 2019-01-01 RX ADMIN — MORPHINE SULFATE 2 MILLIGRAM(S): 50 CAPSULE, EXTENDED RELEASE ORAL at 04:39

## 2019-01-01 RX ADMIN — MORPHINE SULFATE 2 MILLIGRAM(S): 50 CAPSULE, EXTENDED RELEASE ORAL at 12:40

## 2019-01-01 RX ADMIN — TRAMADOL HYDROCHLORIDE 25 MILLIGRAM(S): 50 TABLET ORAL at 23:14

## 2019-01-01 RX ADMIN — Medication 1 MILLIGRAM(S): at 10:15

## 2019-01-01 RX ADMIN — ROBINUL 0.4 MILLIGRAM(S): 0.2 INJECTION INTRAMUSCULAR; INTRAVENOUS at 13:25

## 2019-01-01 RX ADMIN — Medication 650 MILLIGRAM(S): at 23:35

## 2019-01-01 RX ADMIN — LAMOTRIGINE 50 MILLIGRAM(S): 25 TABLET, ORALLY DISINTEGRATING ORAL at 22:03

## 2019-01-01 RX ADMIN — MORPHINE SULFATE 2 MILLIGRAM(S): 50 CAPSULE, EXTENDED RELEASE ORAL at 04:24

## 2019-01-01 RX ADMIN — Medication 650 MILLIGRAM(S): at 13:34

## 2019-01-01 RX ADMIN — Medication 650 MILLIGRAM(S): at 12:36

## 2019-01-01 RX ADMIN — PANTOPRAZOLE SODIUM 40 MILLIGRAM(S): 20 TABLET, DELAYED RELEASE ORAL at 18:32

## 2019-01-01 RX ADMIN — Medication 650 MILLIGRAM(S): at 12:35

## 2019-01-01 RX ADMIN — MORPHINE SULFATE 1 MILLIGRAM(S): 50 CAPSULE, EXTENDED RELEASE ORAL at 13:30

## 2019-01-01 RX ADMIN — Medication 50 MILLILITER(S): at 07:52

## 2019-01-01 RX ADMIN — Medication 40 MILLIGRAM(S): at 03:56

## 2019-01-01 RX ADMIN — ROBINUL 0.4 MILLIGRAM(S): 0.2 INJECTION INTRAMUSCULAR; INTRAVENOUS at 04:23

## 2019-01-01 RX ADMIN — TRAMADOL HYDROCHLORIDE 25 MILLIGRAM(S): 50 TABLET ORAL at 22:00

## 2019-01-01 RX ADMIN — MORPHINE SULFATE 2 MILLIGRAM(S): 50 CAPSULE, EXTENDED RELEASE ORAL at 13:28

## 2019-01-01 RX ADMIN — LAMOTRIGINE 50 MILLIGRAM(S): 25 TABLET, ORALLY DISINTEGRATING ORAL at 13:16

## 2019-01-01 RX ADMIN — SODIUM CHLORIDE 1000 MILLILITER(S): 9 INJECTION INTRAMUSCULAR; INTRAVENOUS; SUBCUTANEOUS at 15:15

## 2019-01-01 RX ADMIN — Medication 650 MILLIGRAM(S): at 22:35

## 2019-01-01 RX ADMIN — Medication 1000 MILLILITER(S): at 12:15

## 2019-01-01 RX ADMIN — LAMOTRIGINE 50 MILLIGRAM(S): 25 TABLET, ORALLY DISINTEGRATING ORAL at 14:25

## 2019-01-01 RX ADMIN — MORPHINE SULFATE 2 MILLIGRAM(S): 50 CAPSULE, EXTENDED RELEASE ORAL at 13:14

## 2019-01-01 RX ADMIN — PANTOPRAZOLE SODIUM 40 MILLIGRAM(S): 20 TABLET, DELAYED RELEASE ORAL at 05:15

## 2019-01-01 RX ADMIN — PANTOPRAZOLE SODIUM 40 MILLIGRAM(S): 20 TABLET, DELAYED RELEASE ORAL at 06:34

## 2019-01-01 RX ADMIN — SODIUM CHLORIDE 50 MILLILITER(S): 9 INJECTION, SOLUTION INTRAVENOUS at 03:44

## 2019-01-01 RX ADMIN — AMLODIPINE BESYLATE 5 MILLIGRAM(S): 2.5 TABLET ORAL at 07:07

## 2019-09-26 NOTE — ED ADULT NURSE NOTE - OBJECTIVE STATEMENT
pt is here for ams biba with wife , as per wife pt fell , pt is on 100 nrb - sat is 99- 100 , hob elevated - swelling to the b/l upper ext noted

## 2019-09-26 NOTE — CONSULT NOTE ADULT - ASSESSMENT
1. Anemia  2. Positive occult blood in stool  3. R/o Peptic ulcer disease  4. R/o colorectal neoplasm  5. R/o AVM's  6. No evidence of acute GI bleeding    Suggestions:    1. Monitor H/H  2. Transfuse PRBC as needed  3. Protonix daily  4. Avoid NSAID  5. Check CEA  6. DVT prophylaxis  7. Hemotology evaluation

## 2019-09-26 NOTE — H&P ADULT - PROBLEM SELECTOR PLAN 5
IMPROVE VTE Individual Risk Assessment          RISK                                                          Points  [  ] Previous VTE                                                3  [  ] Thrombophilia                                             2  [  ] Lower limb paralysis                                   2        (unable to hold up >15 seconds)    [  ] Current Cancer                                             2         (within 6 months)  [  ] Immobilization > 24 hrs                              1  [  ] ICU/CCU stay > 24 hours                             1  [  ] Age > 60                                                         1    IMPROVE VTE Score: 2    holding chemical dvt ppx for gib; scd boots only resuming home meds

## 2019-09-26 NOTE — ED ADULT NURSE NOTE - NSIMPLEMENTINTERV_GEN_ALL_ED
Implemented All Fall with Harm Risk Interventions:  [place] to call system. Call bell, personal items and telephone within reach. Instruct patient to call for assistance. Room bathroom lighting operational. Non-slip footwear when patient is off stretcher. Physically safe environment: no spills, clutter or unnecessary equipment. Stretcher in lowest position, wheels locked, appropriate side rails in place. Provide visual cue, wrist band, yellow gown, etc. Monitor gait and stability. Monitor for mental status changes and reorient to person, place, and time. Review medications for side effects contributing to fall risk. Reinforce activity limits and safety measures with patient and family. Provide visual clues: red socks.

## 2019-09-26 NOTE — H&P ADULT - PROBLEM SELECTOR PLAN 1
p/w fatigue and cp x2 days  H/H 7.0/23.3 on admission - markedly lower than in 2016  occult+  f/u iron studies  transfusing 1 unit PRBCs for now  trend CBC q6  monitor vitals closely  protonix iv push bid  GI consulted - Dr. Delacruz

## 2019-09-26 NOTE — H&P ADULT - HISTORY OF PRESENT ILLNESS
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 History was obtained from wife Barrett at bedside. Patient was sitting on chair when he became non responsive witnessed by wife. Patient was pale during the episode, he did not fall. He was confused/ unconscious till EMS came and checked his BP, SBP was 70s. Patient was put on stature and he started vomiting. Vomitus was non bloody and contained food particles, associated with abdomen pain and retching. He recently completed treatment for his shingles.    As per wife patient had no fever, chest pain, abdomen distension, SOB, orthopnea, PNA, fall, photophobia, seizure, trauma, alcohol abuse, illicit drugs, smoking    GOC: DNR/DNI with no aggressive measures to be taken. 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 As per wife, patient has been eating minimally for 2 days and not as active as he normally is, prompting her to bring him to the ED via ambulance. She report she has had intermittent chest pain during this time. He denies any nausea, vomiting, melena or any other symptoms at this time. Patient reports wanting to go home and is in no acute distress.    GOC: DNR/DNI with no aggressive measures to be taken. 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 As per wife, patient has been eating minimally for 2 days and not as active as he normally is, prompting her to bring him to the ED via ambulance. She report she has had intermittent chest pain during this time. He denies any nausea, vomiting, melena or any other symptoms at this time. Patient reports wanting to go home and is in no acute distress.    GOC: DNR/DNI with no aggressive measures to be taken.

## 2019-09-26 NOTE — H&P ADULT - PROBLEM SELECTOR PLAN 4
IMPROVE VTE Individual Risk Assessment          RISK                                                          Points  [  ] Previous VTE                                                3  [  ] Thrombophilia                                             2  [  ] Lower limb paralysis                                   2        (unable to hold up >15 seconds)    [  ] Current Cancer                                             2         (within 6 months)  [  ] Immobilization > 24 hrs                              1  [  ] ICU/CCU stay > 24 hours                             1  [  ] Age > 60                                                         1    IMPROVE VTE Score: 2    holding chemical dvt ppx for gib; scd boots only resuming home meds creatinine on admission 3.37 - was normal in 2016  likely pre-renal in setting of GI bleed and poor oral intake  f/u urine lytes  avoid nephrotoxins  f/u renal sono  VBG shows pH of 7.24; bicarb 20  metabolic acidosis in setting of renal failure  f/u bmp daily

## 2019-09-26 NOTE — ED PROVIDER NOTE - OBJECTIVE STATEMENT
89 yo male with PMhx of pacemaker, trigeminal neurologia, presents with AMS - a complete HPI is limited due to the patient's underlying condition.  Per wife, the patient is normally verbal and ambulates with assistance. Per wife, for the last 2-3 days the patient has had decreased appetite and generalized weakness. Per wife, today the patient was not answering her questions. While in the ED, the patient is tachycardic, hypoxic. The goals of care were discussed with the patient's wife; the patient is DNR and DNI - no intubation or chest compressions.

## 2019-09-26 NOTE — CONSULT NOTE ADULT - NEGATIVE ENMT SYMPTOMS
no ear pain/no hearing difficulty/no gum bleeding/no throat pain/no nose bleeds/no dry mouth/no dysphagia

## 2019-09-26 NOTE — H&P ADULT - PROBLEM SELECTOR PLAN 2
troponin 0.090 on admission  f/u t2 and t3  EKG shows sinus tachy  tele monitoring  f/u TTE  holding asa and bblocker for GI bleed  starting statin p/w WBC >12K, HR>90, lactate 2.8  UA negative  CXR unremarkable  s/p 2L NS bolus in ED  s/p zosyn x1 in ED  will defer further abx at this time given no clear source of infection  f/u blood cx

## 2019-09-26 NOTE — ED ADULT NURSE NOTE - ED STAT RN HANDOFF DETAILS
pt is upgradred to tele , endorsed to rn tara, repeat labs sent , send specimen type and screen sent , awaiting blood banl

## 2019-09-26 NOTE — H&P ADULT - PROBLEM SELECTOR PLAN 6
IMPROVE VTE Individual Risk Assessment          RISK                                                          Points  [  ] Previous VTE                                                3  [  ] Thrombophilia                                             2  [  ] Lower limb paralysis                                   2        (unable to hold up >15 seconds)    [  ] Current Cancer                                             2         (within 6 months)  [  ] Immobilization > 24 hrs                              1  [  ] ICU/CCU stay > 24 hours                             1  [  ] Age > 60                                                         1    IMPROVE VTE Score: 2    holding chemical dvt ppx for gib; scd boots only

## 2019-09-26 NOTE — CONSULT NOTE ADULT - SUBJECTIVE AND OBJECTIVE BOX
[  ] STAT REQUEST              [ X ] ROUTINE REQUEST    Patient is a 89 year old male with gastrointestinal bleeding. GI consulted to evaluate.        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 As per wife, patient has been eating minimally for 2 days and not as active as he normally is, prompting her to bring him to the ED via ambulance. She report she has had intermittent chest pain during this time. He denies any nausea, vomiting, melena or any other symptoms at this time. Patient reports wanting to go home and is in no acute distress.    GOC: DNR/DNI with no aggressive measures to be taken. (26 Sep 2019 17:50)      PAIN MANAGEMENT:  Pain Scale:                 /10  Pain Location:      Prior Colonoscopy:    PAST MEDICAL HISTORY  Shingles  Pacemaker  Trigeminal neuralgia      PAST SURGICAL HISTORY  No significant past surgical history      Allergies    No Known Allergies    Intolerances        HOME MEDICATIONS    MEDICATIONS  (STANDING):  carBAMazepine XR Tablet 200 milliGRAM(s) Oral daily  lamoTRIgine 50 milliGRAM(s) Oral <User Schedule>  pantoprazole  Injectable 40 milliGRAM(s) IV Push every 12 hours  sodium chloride 0.9%. 1000 milliLiter(s) (50 mL/Hr) IV Continuous <Continuous>    MEDICATIONS  (PRN):      SOCIAL HISTORY  Advanced Directives:       [  ] Full Code       [ X ] DNR  Marital Status:         [X  ] M      [  ] S      [  ] D       [  ] W  Children:       [X  ] Yes      [  ] No  Occupation:        [  ] Employed       [ X ] Unemployed       [  ] Retired  Diet:       [ X ] Regular       [  ] PEG feeding          [  ] NG tube feeding  Drug Use:           [ X ] Patient denied          [  ] Yes  Alcohol:           [ X ] No             [  ] Yes (socially)         [  ] Yes (chronic)  Tobacco:           [  ] Yes           [ X ] No    FAMILY HISTORY  [ X ] Heart Disease            [  ] Diabetes             [  ] HTN             [  ] Colon Cancer             [  ] Stomach Cancer              [  ] Pancreatic Cancer    VITAL SIGNS   Vital Signs Last 24 Hrs  T(C): 35.6 (26 Sep 2019 21:01), Max: 37.1 (26 Sep 2019 16:01)  T(F): 96 (26 Sep 2019 21:01), Max: 98.8 (26 Sep 2019 16:01)  HR: 92 (26 Sep 2019 21:01) (92 - 114)  BP: 152/89 (26 Sep 2019 21:01) (147/78 - 159/70)   RR: 18 (26 Sep 2019 21:01) (18 - 20)  SpO2: 100% (26 Sep 2019 21:01) (88% - 100%)  Daily Height in cm: 170.18 (26 Sep 2019 11:27)    Daily Weight in k.1 (26 Sep 2019 21:01)       CBC Full  -  ( 26 Sep 2019 18:26 )  WBC Count : 11.46 K/uL  RBC Count : 2.41 M/uL  Hemoglobin : 6.7 g/dL  Hematocrit : 22.6 %  Platelet Count - Automated : 127 K/uL  Mean Cell Volume : 93.8 fl  Mean Cell Hemoglobin : 27.8 pg  Mean Cell Hemoglobin Concentration : 29.6 gm/dL  Auto Neutrophil # : x  Auto Lymphocyte # : x  Auto Monocyte # : x  Auto Eosinophil # : x  Auto Basophil # : x  Auto Neutrophil % : x  Auto Lymphocyte % : x  Auto Monocyte % : x  Auto Eosinophil % : x  Auto Basophil % : x          144  |  117<H>  |  84<H>  ----------------------------<  102<H>  4.6   |  15<L>  |  3.18<H>    Ca    7.9<L>      26 Sep 2019 18:26    TPro  5.5<L>  /  Alb  2.8<L>  /  TBili  0.9  /  DBili  x   /  AST  34  /  ALT  26  /  AlkPhos  86       Occult Blood, Feces (19 @ 14:29)    Occult Blood, Feces: Positive    Iron with Total Binding Capacity (19 @ 18:27)    Iron - Total Binding Capacity.: 169 ug/dL    % Saturation, Iron: 58 %    Iron Total, Serum: 98 ug/dL    Unsaturated Iron Binding Capacity: 71 ug/dL    Urinalysis (19 @ 14:13)    Glucose Qualitative, Urine: Negative    Blood, Urine: Negative    pH Urine: 5.0    Color: Yellow    Urine Appearance: Clear    Bilirubin: Negative    Ketone - Urine: Negative    Specific Gravity: 1.020    Protein, Urine: 30 mg/dL    Urobilinogen: Negative    Nitrite: Negative    Leukocyte Esterase Concentration: Negative        ECG  Ventricular Rate 68 BPM    Atrial Rate 46 BPM    QRS Duration 170 ms     ms    QTc 545 ms    R Axis -78 degrees    T Axis 91 degrees    Diagnosis Line AV sequential or dual chamber electronic pacemaker        RADIOLOGY/IMAGING                EXAM:  CT BRAIN                            PROCEDURE DATE:  2019          INTERPRETATION:  Head CT without IV contrast     Clinical Indication: altered mental status    Technique: Axial CT images of the head are obtained from the skull base   to the vertex without IV contrast.    Comparison: 2016.    Findings: There is no acute intracranial hemorrhage. No CT evidence for   an acute territorial infarct. New encephalomalacia in the right occipital   lobe, compatible with chronic infarction. Stable small old lacunar   infarct in the right basal ganglia. Stable patchy hypodensities in the   periventricular and subcortical white matter bilaterally, compatible with   chronic small vessel ischemic disease. There is no space-occupying   lesion, midline shift, or herniation. The ventricles maintain normal   size, shape, and location.  No extra-axial fluid collection. 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 The visualized paranasal sinuses and orbits appear grossly   unremarkable.    Impression: No acute intracranial hemorrhage. No CT evidence for an acute   territorial infarct.    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    If clinically indicated, brain MR and pituitary MR may be pursued for   further evaluation. [  ] STAT REQUEST              [ X ] ROUTINE REQUEST    Patient is a 89 year old male with gastrointestinal bleeding. GI consulted to evaluate.        HPI:  89 year old male with multiple medical problems presented with 2 days history of lethargy and poor appetite. As per wife, patient has been eating minimally for 2 days and not as active as he normally is, prompting her to bring him to the ED via ambulance. She report she has had intermittent chest pain during this time. He denies abdominal pain, nausea, vomiting, hematemesis, hematochezia, melena fever, chills, palpitation, cough, hematuria, dysuria or diarrhea.               PAIN MANAGEMENT:  Pain Scale:                 0/10  Pain Location:      Prior Colonoscopy:  No prior colonoscopy    PAST MEDICAL HISTORY  Shingles  Pacemaker  Trigeminal neuralgia  Bradycardia      PAST SURGICAL HISTORY   PPM      Allergies    No Known Allergies          MEDICATIONS  (STANDING):  carBAMazepine XR Tablet 200 milliGRAM(s) Oral daily  lamoTRIgine 50 milliGRAM(s) Oral <User Schedule>  pantoprazole  Injectable 40 milliGRAM(s) IV Push every 12 hours  sodium chloride 0.9%. 1000 milliLiter(s) (50 mL/Hr) IV Continuous <Continuous>         SOCIAL HISTORY  Advanced Directives:       [  ] Full Code       [ X ] DNR  Marital Status:         [X  ] M      [  ] S      [  ] D       [  ] W  Children:       [X  ] Yes      [  ] No  Occupation:        [  ] Employed       [ X ] Unemployed       [  ] Retired  Diet:       [ X ] Regular       [  ] PEG feeding          [  ] NG tube feeding  Drug Use:           [ X ] Patient denied          [  ] Yes  Alcohol:           [ X ] No             [  ] Yes (socially)         [  ] Yes (chronic)  Tobacco:           [  ] Yes           [ X ] No    FAMILY HISTORY  [ X ] Heart Disease            [  ] Diabetes             [  ] HTN             [  ] Colon Cancer             [  ] Stomach Cancer              [  ] Pancreatic Cancer    VITAL SIGNS   Vital Signs Last 24 Hrs  T(C): 35.6 (26 Sep 2019 21:01), Max: 37.1 (26 Sep 2019 16:01)  T(F): 96 (26 Sep 2019 21:01), Max: 98.8 (26 Sep 2019 16:01)  HR: 92 (26 Sep 2019 21:01) (92 - 114)  BP: 152/89 (26 Sep 2019 21:01) (147/78 - 159/70)   RR: 18 (26 Sep 2019 21:01) (18 - 20)  SpO2: 100% (26 Sep 2019 21:01) (88% - 100%)  Daily Height in cm: 170.18 (26 Sep 2019 11:27)    Daily Weight in k.1 (26 Sep 2019 21:01)       CBC Full  -  ( 26 Sep 2019 18:26 )  WBC Count : 11.46 K/uL  RBC Count : 2.41 M/uL  Hemoglobin : 6.7 g/dL  Hematocrit : 22.6 %  Platelet Count - Automated : 127 K/uL  Mean Cell Volume : 93.8 fl  Mean Cell Hemoglobin : 27.8 pg  Mean Cell Hemoglobin Concentration : 29.6 gm/dL  Auto Neutrophil # : x  Auto Lymphocyte # : x  Auto Monocyte # : x  Auto Eosinophil # : x  Auto Basophil # : x  Auto Neutrophil % : x  Auto Lymphocyte % : x  Auto Monocyte % : x  Auto Eosinophil % : x  Auto Basophil % : x          144  |  117<H>  |  84<H>  ----------------------------<  102<H>  4.6   |  15<L>  |  3.18<H>    Ca    7.9<L>      26 Sep 2019 18:26    TPro  5.5<L>  /  Alb  2.8<L>  /  TBili  0.9  /  DBili  x   /  AST  34  /  ALT  26  /  AlkPhos  86       Occult Blood, Feces (19 @ 14:29)    Occult Blood, Feces: Positive    Iron with Total Binding Capacity (19 @ 18:27)    Iron - Total Binding Capacity.: 169 ug/dL    % Saturation, Iron: 58 %    Iron Total, Serum: 98 ug/dL    Unsaturated Iron Binding Capacity: 71 ug/dL    Urinalysis (19 @ 14:13)    Glucose Qualitative, Urine: Negative    Blood, Urine: Negative    pH Urine: 5.0    Color: Yellow    Urine Appearance: Clear    Bilirubin: Negative    Ketone - Urine: Negative    Specific Gravity: 1.020    Protein, Urine: 30 mg/dL    Urobilinogen: Negative    Nitrite: Negative    Leukocyte Esterase Concentration: Negative        ECG  Ventricular Rate 68 BPM    Atrial Rate 46 BPM    QRS Duration 170 ms     ms    QTc 545 ms    R Axis -78 degrees    T Axis 91 degrees    Diagnosis Line AV sequential or dual chamber electronic pacemaker        RADIOLOGY/IMAGING                EXAM:  CT BRAIN                            PROCEDURE DATE:  2019          INTERPRETATION:  Head CT without IV contrast     Clinical Indication: altered mental status    Technique: Axial CT images of the head are obtained from the skull base   to the vertex without IV contrast.    Comparison: 2016.    Findings: There is no acute intracranial hemorrhage. No CT evidence for   an acute territorial infarct. New encephalomalacia in the right occipital   lobe, compatible with chronic infarction. Stable small old lacunar   infarct in the right basal ganglia. Stable patchy hypodensities in the   periventricular and subcortical white matter bilaterally, compatible with   chronic small vessel ischemic disease. There is no space-occupying   lesion, midline shift, or herniation. The ventricles maintain normal   size, shape, and location.  No extra-axial fluid collection. 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 The visualized paranasal sinuses and orbits appear grossly   unremarkable.    Impression: No acute intracranial hemorrhage. No CT evidence for an acute   territorial infarct.    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    If clinically indicated, brain MR and pituitary MR may be pursued for   further evaluation.

## 2019-09-26 NOTE — H&P ADULT - ASSESSMENT
89 y/o male admitted to [facility] with symptomatic anemia and elevated cardiac enzymes.

## 2019-09-26 NOTE — PATIENT PROFILE ADULT - LANGUAGE ASSISTANCE NEEDED
patient is lethargic and nonverbal/No-Patient/Caregiver offered and refused free interpretation services.

## 2019-09-26 NOTE — ED PROVIDER NOTE - PROGRESS NOTE DETAILS
pt likely septic.  wbc of 12, lactate of 2.8.  UA  negative, CXR pending.  cultures taken prior to abx.  H/H 7/23, guaic pending.  pt also dehydrated with creat of 3.37, BUN of 88 for which patient was given fluid.  Wife confirmed DNR/DNI.  case discussed with Dr. Bryant, will admit to medical floor.

## 2019-09-26 NOTE — ED PROVIDER NOTE - CLINICAL SUMMARY MEDICAL DECISION MAKING FREE TEXT BOX
89 yo male present with AMS. Will obtain labs and UA to r/o infection, CXR, CT head, and will reassess.

## 2019-09-26 NOTE — H&P ADULT - NSHPPHYSICALEXAM_GEN_ALL_CORE
Vital Signs Last 24 Hrs  T(C): 37.1 (26 Sep 2019 16:01), Max: 37.1 (26 Sep 2019 16:01)  T(F): 98.8 (26 Sep 2019 16:01), Max: 98.8 (26 Sep 2019 16:01)  HR: 93 (26 Sep 2019 17:45) (93 - 114)  BP: 149/67 (26 Sep 2019 17:45) (147/78 - 159/70)  BP(mean): --  RR: 18 (26 Sep 2019 17:45) (18 - 20)  SpO2: 100% (26 Sep 2019 17:45) (88% - 100%)

## 2019-09-26 NOTE — H&P ADULT - PROBLEM SELECTOR PLAN 3
resuming home meds creatinine on admission 3.37 - was normal in 2016  likely pre-renal in setting of GI bleed and poor oral intake  f/u urine lytes  avoid nephrotoxins  f/u renal sono  VBG shows pH of 7.24; bicarb 20  metabolic acidosis in setting of renal failure  f/u bmp daily troponin 0.090 on admission  f/u t2 and t3  EKG shows sinus tachy  tele monitoring  f/u TTE  holding asa and bblocker for GI bleed  starting statin

## 2019-09-27 NOTE — PHYSICAL THERAPY INITIAL EVALUATION ADULT - DIAGNOSIS, PT EVAL
Patient presented with generalized weakness, impaired sitting balance and was unable to perform OOB activities at this time

## 2019-09-28 NOTE — CHART NOTE - NSCHARTNOTEFT_GEN_A_CORE
EVENT:   Patient's Blood glucose level - 66, Stat FS - 49. Patient's on Dysphagia 1 Pureed - Thin Liquid (No conc. potassium, No conc. phosphorous, low sodium) diet.   OBJECTIVE:  Vital Signs Last 24 Hrs  T(C): 36.3 (28 Sep 2019 07:34), Max: 36.8 (27 Sep 2019 11:19)  T(F): 97.3 (28 Sep 2019 07:34), Max: 98.2 (27 Sep 2019 11:19)  HR: 109 (28 Sep 2019 07:34) (67 - 109)  BP: 158/97 (28 Sep 2019 07:34) (138/75 - 174/112)  BP(mean): --  RR: 16 (28 Sep 2019 07:34) (14 - 18)  SpO2: 96% (28 Sep 2019 07:34) (95% - 100%)    FOCUSED PHYSICAL EXAM:    LABS:                        8.9    12.35 )-----------( 106      ( 28 Sep 2019 05:51 )             28.6   CARDIAC MARKERS ( 26 Sep 2019 18:26 )  0.085 ng/mL / x     / 246 U/L / x     / 6.2 ng/mL  CARDIAC MARKERS ( 26 Sep 2019 12:33 )  0.090 ng/mL / x     / 223 U/L / x     / 5.7 ng/mL    09-28    148<H>  |  120<H>  |  87<H>  ----------------------------<  66<L>  4.2   |  15<L>  |  3.94<H>    Ca    7.9<L>      28 Sep 2019 05:51  Phos  6.7     09-28  Mg     2.9     09-28    TPro  5.5<L>  /  Alb  2.8<L>  /  TBili  1.1  /  DBili  x   /  AST  48<H>  /  ALT  29  /  AlkPhos  82  09-28      EKG:   IMAGING:    ASSESSMENT:  HPI:  89 y/o male from home ambulates independently and sometimes with walker, with PMHx of trigeminal neuralgia, 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 As per wife, patient has been eating minimally for 2 days and not as active as he normally is, prompting her to bring him to the ED via ambulance. She report she has had intermittent chest pain during this time. He denies any nausea, vomiting, melena or any other symptoms at this time. Patient reports wanting to go home and is in no acute distress.    GOC: DNR/DNI with no aggressive measures to be taken. (26 Sep 2019 17:50)      PLAN: Stat dextrose 50% IVP x1 dose.     FOLLOW UP / RESULT:

## 2019-09-28 NOTE — DIETITIAN INITIAL EVALUATION ADULT. - OTHER INFO
Per spouse, patient with poor po intake for 1 week PTA, Reports 4% wt loss in 3 months( 59 to 56.3kg). Per goals of care, No aggressive measures.

## 2019-09-28 NOTE — DIETITIAN INITIAL EVALUATION ADULT. - PROBLEM SELECTOR PLAN 4
creatinine on admission 3.37 - was normal in 2016  likely pre-renal in setting of GI bleed and poor oral intake  f/u urine lytes  avoid nephrotoxins  f/u renal sono  VBG shows pH of 7.24; bicarb 20  metabolic acidosis in setting of renal failure  f/u bmp daily

## 2019-09-28 NOTE — DIETITIAN INITIAL EVALUATION ADULT. - PROBLEM SELECTOR PLAN 3
troponin 0.090 on admission  f/u t2 and t3  EKG shows sinus tachy  tele monitoring  f/u TTE  holding asa and bblocker for GI bleed  starting statin

## 2019-09-28 NOTE — DIETITIAN INITIAL EVALUATION ADULT. - FACTORS AFF FOOD INTAKE
as having lost dentures 2 months ago. refusing to eat presently/difficulty chewing/persistent lack of appetite

## 2019-09-28 NOTE — DIETITIAN INITIAL EVALUATION ADULT. - PERTINENT LABORATORY DATA
09-28 Na148 mmol/L<H> Glu 66 mg/dL<L> K+ 4.2 mmol/L Cr  3.94 mg/dL<H> BUN 87 mg/dL<H> 09-28 Phos 6.7 mg/dL<H> 09-28 Alb 2.8 g/dL<L> 09-27 [id number] 5.0 % 09-27 Chol 99 mg/dL LDL 43 mg/dL HDL 28 mg/dL<L> Trig 139 mg/dL

## 2019-09-28 NOTE — DIETITIAN INITIAL EVALUATION ADULT. - PROBLEM SELECTOR PLAN 2
p/w WBC >12K, HR>90, lactate 2.8  UA negative  CXR unremarkable  s/p 2L NS bolus in ED  s/p zosyn x1 in ED  will defer further abx at this time given no clear source of infection  f/u blood cx

## 2019-09-28 NOTE — CHART NOTE - NSCHARTNOTEFT_GEN_A_CORE
Upon Nutritional Assessment by the Registered Dietitian your patient was determined to meet criteria / has evidence of the following diagnosis/diagnoses:          [ ]  Mild Protein Calorie Malnutrition        [ ]  Moderate Protein Calorie Malnutrition        [x ] Severe Protein Calorie Malnutrition        [ ] Unspecified Protein Calorie Malnutrition        [ ] Underweight / BMI <19        [ ] Morbid Obesity / BMI > 40      Findings as based on:  •  Comprehensive nutrition assessment and consultation  •  Calorie counts (nutrient intake analysis)  •  Food acceptance and intake status from observations by staff  •  Follow up  •  Patient education  •  Intervention secondary to interdisciplinary rounds  •   concerns      Treatment:    The following diet has been recommended:      PROVIDER Section:     By signing this assessment you are acknowledging and agree with the diagnosis/diagnoses assigned by the Registered Dietitian    Comments:  provide diet as per goals of care, spoke with RN, paged/texted MD

## 2019-09-29 NOTE — CHART NOTE - NSCHARTNOTEFT_GEN_A_CORE
EVENT:   Patient's FS (3am) - 19.  OBJECTIVE:  Vital Signs Last 24 Hrs  T(C): 36.2 (29 Sep 2019 01:02), Max: 36.3 (28 Sep 2019 07:34)  T(F): 97.2 (29 Sep 2019 01:02), Max: 97.3 (28 Sep 2019 07:34)  HR: 66 (29 Sep 2019 05:21) (54 - 109)  BP: 158/74 (29 Sep 2019 05:21) (131/63 - 158/97)  BP(mean): --  RR: 16 (29 Sep 2019 01:02) (16 - 16)  SpO2: 94% (29 Sep 2019 01:02) (94% - 96%)    FOCUSED PHYSICAL EXAM:    LABS:                        8.9    12.35 )-----------( 106      ( 28 Sep 2019 05:51 )             28.6     09-28    148<H>  |  120<H>  |  87<H>  ----------------------------<  66<L>  4.2   |  15<L>  |  3.94<H>    Ca    7.9<L>      28 Sep 2019 05:51  Phos  6.7     09-28  Mg     2.9     09-28    TPro  5.5<L>  /  Alb  2.8<L>  /  TBili  1.1  /  DBili  x   /  AST  48<H>  /  ALT  29  /  AlkPhos  82  09-28      EKG:   IMAGING:    ASSESSMENT:  HPI:  89 y/o male from home ambulates independently and sometimes with walker, with PMHx of trigeminal neuralgia, 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 As per wife, patient has been eating minimally for 2 days and not as active as he normally is, prompting her to bring him to the ED via ambulance. She report she has had intermittent chest pain during this time. He denies any nausea, vomiting, melena or any other symptoms at this time. Patient reports wanting to go home and is in no acute distress.    GOC: DNR/DNI with no aggressive measures to be taken. (26 Sep 2019 17:50)      PLAN: Dextrose 50% 50 ml IVP x1 dose given.     FOLLOW UP / RESULT: At 6am, FS - 157.

## 2019-09-30 NOTE — PROGRESS NOTE ADULT - NEGATIVE GENERAL GENITOURINARY SYMPTOMS
no renal colic/no hematuria/no dysuria
no dysuria/no hematuria/no renal colic
no renal colic/no dysuria/no hematuria

## 2019-09-30 NOTE — PROGRESS NOTE ADULT - NEGATIVE PSYCHIATRIC SYMPTOMS
no suicidal ideation/no anxiety/no depression
no anxiety/no depression/no suicidal ideation
no suicidal ideation/no depression/no anxiety

## 2019-09-30 NOTE — PROGRESS NOTE ADULT - NEGATIVE ENMT SYMPTOMS
no ear pain/no hearing difficulty/no gum bleeding/no dry mouth/no throat pain/no nose bleeds/no dysphagia
no nose bleeds/no dry mouth/no dysphagia/no hearing difficulty/no throat pain/no ear pain/no gum bleeding
no gum bleeding/no throat pain/no dysphagia/no ear pain/no hearing difficulty/no nose bleeds/no dry mouth

## 2019-09-30 NOTE — PROGRESS NOTE ADULT - NEGATIVE GENERAL SYMPTOMS
no sweating/no anorexia/no weight loss/no chills/no fever
no sweating/no weight loss/no chills/no anorexia/no fever
no sweating/no anorexia/no weight loss/no fever/no chills

## 2019-09-30 NOTE — DISCHARGE NOTE PROVIDER - CARE PROVIDER_API CALL
Josefa Olmos ()  Family Medicine; [facility]  [address] [address] [place] [address]  Phone: [phone]  Fax: [phone]  Follow Up Time:

## 2019-09-30 NOTE — PROGRESS NOTE ADULT - NEGATIVE MUSCULOSKELETAL SYMPTOMS
no muscle cramps/no stiffness/no neck pain/no muscle weakness
no muscle cramps/no neck pain/no stiffness/no muscle weakness
no muscle weakness/no muscle cramps/no neck pain/no stiffness

## 2019-09-30 NOTE — CONSULT NOTE ADULT - PROBLEM SELECTOR RECOMMENDATION 3
Temporal wasting. Albumin 2.2.  Poor po intake.  Pt currently has high risk of mortality. Comfort feeds.

## 2019-09-30 NOTE — GOALS OF CARE CONVERSATION - ADVANCED CARE PLANNING - TREATMENT GUIDELINE COMMENT
Patient's spouse/NOK surrogate has chosen comfort measures only stating " this is not a life being connected to tubes and prolonging suffering."   Family is requesting the patient not suffer and be kept comfortable.

## 2019-09-30 NOTE — CHART NOTE - NSCHARTNOTEFT_GEN_A_CORE
EVENT: Hypoglycemia of 31  - Was called by RN to evaluate pt whose Blood glucose level at 1230AM was 31, and repeated BS level was 27. Pt'a alert & confused.  -    OBJECTIVE:  Vital Signs Last 24 Hrs  T(C): 36.8 (29 Sep 2019 23:50), Max: 36.8 (29 Sep 2019 23:50)  T(F): 98.3 (29 Sep 2019 23:50), Max: 98.3 (29 Sep 2019 23:50)  HR: 71 (29 Sep 2019 23:50) (58 - 81)  BP: 173/86 (29 Sep 2019 23:50) (135/58 - 173/86)  BP(mean): --  RR: 17 (29 Sep 2019 23:50) (16 - 18)  SpO2: 100% (29 Sep 2019 23:50) (95% - 100%)    FOCUSED PHYSICAL EXAM:  Neuro: awake, alert, & confused. No neuro deficit  Cardiovascular: Pulses +2 B/L in lower and upper extremities, HR regular, BP stable, No edema.  Respiratory: Respirations regular, unlabored, breath sounds clear B/L.   GI: Abdomen soft, non-tender, positive bowel sounds.  : no bladder distention noted. No complaints at this time.  Skin: Dry, intact, no bruising, no diaphoresis.    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 As per wife, patient has been eating minimally for 2 days and not as active as he normally is, prompting her to bring him to the ED via ambulance. She report she has had intermittent chest pain during this time. He denies any nausea, vomiting, melena or any other symptoms at this time. Patient reports wanting to go home and is in no acute distress.    GOC: DNR/DNI with no aggressive measures to be taken. (26 Sep 2019 17:50)      PLAN: Glucan 1mg, IM & Dextrose 50% 12.5 Gm, IVP x 1 dose given as ordered    FOLLOW UP / RESULT: Repeated blood sugar 15 mins later was 168

## 2019-09-30 NOTE — PROGRESS NOTE ADULT - NEGATIVE NEUROLOGICAL SYMPTOMS
no tremors/no vertigo/no headache/no syncope
no tremors/no headache/no syncope/no vertigo
no tremors/no headache/no syncope/no vertigo

## 2019-09-30 NOTE — PROGRESS NOTE ADULT - ATTENDING COMMENTS
Patient is seen and examined. Case reviewed with the medical team. Above note is appreciated. Will follow up clinically. Continue DVT prophylaxis. Case discussed with  GI. Start PT.
Patient is seen and examined. Case reviewed with the medical team. Above note is appreciated. Will follow up clinically. Continue DVT prophylaxis. Case discussed with  GI. Start PT. Discharge planning.
Patient is seen and examined. Case reviewed with the medical team. Above note is appreciated. Will follow up clinically. Continue DVT prophylaxis. Case discussed with  GI and palliative care this morning.  Patient in continuous decline. Patient likely a candidate for in hospital hospice with the symptomatic anemia and molting of the skin. Overall prognosis very poor despite any medical intervention. Patient DNR, and DNI. Wife agrees to hospice evaluation.
Patient is seen and examined. Case reviewed with the medical team. Above note is appreciated. Will follow up clinically. Continue DVT prophylaxis. Case discussed with  GI. Start PT.
Patient is seen and examined. Case reviewed with the medical team. Above note is appreciated. Will follow up clinically. Continue DVT prophylaxis. Case discussed with Palliative care.

## 2019-09-30 NOTE — PROGRESS NOTE ADULT - PROBLEM SELECTOR PROBLEM 5
Trigeminal neuralgia
Need for prophylactic measure
Trigeminal neuralgia

## 2019-09-30 NOTE — PROGRESS NOTE ADULT - PROBLEM SELECTOR PLAN 2
p/w WBC >12K, HR>90, lactate 2.8  UA negative  CXR unremarkable  s/p 2L NS bolus in ED  s/p zosyn x1 in ED  will defer further abx at this time given no clear source of infection  f/u blood cx  UNLIKELY SEPSIS, as patient as no altered mental status, patient hypertensive, HR likely wnl for patient. Will not start patient on any antibiotics at this time
-plan as above
p/w WBC >12K, HR>90, lactate 2.8  UA negative  CXR unremarkable  s/p 2L NS bolus in ED  s/p zosyn x1 in ED  will defer further abx at this time given no clear source of infection  f/u blood cx  UNLIKELY SEPSIS, as patient as no altered mental status, patient hypertensive, HR likely wnl for patient. Will not start patient on any antibiotics at this time

## 2019-09-30 NOTE — CONSULT NOTE ADULT - PROBLEM SELECTOR RECOMMENDATION 9
2/2 GI bleed.  Hemoglobin of 7 with  +occult blood, S/p 1 unit of PRBCs.    Per GI, High risk for EGD and colonoscopy.   Appropriate for inpatient hospice, continues to decline clinically, no longer taking po,  more lethargic.   Patient's family does not want any further diagnotic workup, or transfusions.    Morphine 1 mg IVP every 1 hour for dyspnea  Ativan 1 mg IVP every 1 hours for agitation

## 2019-09-30 NOTE — PROGRESS NOTE ADULT - PROBLEM SELECTOR PLAN 6
IMPROVE VTE Individual Risk Assessment          RISK                                                          Points  [  ] Previous VTE                                                3  [  ] Thrombophilia                                             2  [  ] Lower limb paralysis                                   2        (unable to hold up >15 seconds)    [  ] Current Cancer                                             2         (within 6 months)  [  ] Immobilization > 24 hrs                              1  [  ] ICU/CCU stay > 24 hours                             1  [  ] Age > 60                                                         1    IMPROVE VTE Score: 2    holding chemical dvt ppx for gib; scd boots only
-as per HCP pt's spouse pt DNR/DNI with no aggressive measures   -pt clinically declining, more lethargic, mottling of upper and lower extremities, discussed with spouse current clinical status and  treatment options, pt's spouse openly expressed that she does not want him to suffer or cause him any pain with further needle sticks, tests and procedures   -MEWS suspended   -no further blood works/tests or procedures   -cont comfort care   -cont ativan, morphine and glycopyrrolate for symptom management
IMPROVE VTE Individual Risk Assessment          RISK                                                          Points  [  ] Previous VTE                                                3  [  ] Thrombophilia                                             2  [  ] Lower limb paralysis                                   2        (unable to hold up >15 seconds)    [  ] Current Cancer                                             2         (within 6 months)  [  ] Immobilization > 24 hrs                              1  [  ] ICU/CCU stay > 24 hours                             1  [  ] Age > 60                                                         1    IMPROVE VTE Score: 2    holding chemical dvt ppx for gib; scd boots only

## 2019-09-30 NOTE — PROGRESS NOTE ADULT - NEUROLOGICAL DETAILS
sensation intact/responds to pain/responds to verbal commands
sensation intact/responds to verbal commands/responds to pain
responds to pain/responds to verbal commands/sensation intact

## 2019-09-30 NOTE — PROGRESS NOTE ADULT - PROBLEM SELECTOR PROBLEM 4
Acute renal failure (ARF)

## 2019-09-30 NOTE — PROGRESS NOTE ADULT - GENITOURINARY
"Chief Complaint   Patient presents with     Foot Pain     left foot pain worsening with walking. Stepped on nail about 2-3 year ago. Pain is on and off but worsening.      Medication Reconciliation     completed but needs attention       BP (!) 163/74   Pulse 66   Temp 97.5  F (36.4  C) (Oral)   Resp 20   Ht 1.41 m (4' 7.51\")   Wt 70.3 kg (155 lb)   SpO2 99%   BMI 35.36 kg/m        BP recheck: 133/73      Due to patient being non-English speaking/uses sign language, an  was used for this visit. Only for face-to-face interpretation by an external agency, date and length of interpretation can be found on the scanned worksheet.     name: Myles Santos  Agency: [employer]  Language: Bobby   Telephone number: [phone]  Type of interpretation: Face-to-face, spoken      Offered mammogram but declines at this time. Will do next year.     FIT test needed.     "
details…

## 2019-09-30 NOTE — PROGRESS NOTE ADULT - SUBJECTIVE AND OBJECTIVE BOX
[   ] ICU                                          [   ] CCU                                      [  X ] Medical Floor      Patient is comfortable. No new complaints reported, No abdominal pain, N/V, hematemesis, hematochezia, melena, fever, chills, chest pain, SOB, cough or diarrhea reported.    VITALS  Vital Signs Last 24 Hrs  T(C): 36.3 (27 Sep 2019 17:03), Max: 36.8 (27 Sep 2019 07:50)  T(F): 97.4 (27 Sep 2019 17:03), Max: 98.2 (27 Sep 2019 07:50)  HR: 67 (27 Sep 2019 17:03) (67 - 102)  BP: 148/75 (27 Sep 2019 17:03) (144/93 - 174/112)   RR: 18 (27 Sep 2019 17:03) (18 - 19)  SpO2: 99% (27 Sep 2019 17:03) (95% - 100%)         MEDICATIONS  (STANDING):  amLODIPine   Tablet 5 milliGRAM(s) Oral daily  carBAMazepine XR Tablet 200 milliGRAM(s) Oral daily  lamoTRIgine 50 milliGRAM(s) Oral <User Schedule>  metoprolol succinate ER 50 milliGRAM(s) Oral daily  pantoprazole  Injectable 40 milliGRAM(s) IV Push every 12 hours    MEDICATIONS  (PRN):  acetaminophen   Tablet .. 650 milliGRAM(s) Oral every 6 hours PRN Moderate Pain (4 - 6)  traMADol 25 milliGRAM(s) Oral every 8 hours PRN Severe Pain (7 - 10)                            9.3    13.70 )-----------( 87       ( 27 Sep 2019 14:19 )             30.1       09-27    145  |  116<H>  |  84<H>  ----------------------------<  76  4.6   |  16<L>  |  3.57<H>    Ca    8.0<L>      27 Sep 2019 07:22  Phos  6.3     09-27  Mg     2.8     09-27    TPro  5.5<L>  /  Alb  2.8<L>  /  TBili  1.7<H>  /  DBili  x   /  AST  53<H>  /  ALT  33  /  AlkPhos  87  09-27
CHIEF COMPLAINT:Patient is a 89y old  Male who presents with a chief complaint of GI bleed (30 Sep 2019 10:22)    	  REVIEW OF SYSTEMS:  CONSTITUTIONAL: No fever, weight loss, or fatigue  EYES: No eye pain, visual disturbances, or discharge  ENMT:  No difficulty hearing, tinnitus, vertigo; No sinus or throat pain  NECK: No pain or stiffness  RESPIRATORY: No cough, wheezing, chills or hemoptysis; No Shortness of Breath  CARDIOVASCULAR: No chest pain, palpitations, passing out, dizziness, or leg swelling  GASTROINTESTINAL: No abdominal or epigastric pain. No nausea, vomiting, or hematemesis; No diarrhea or constipation. No melena or hematochezia.  GENITOURINARY: No dysuria, frequency, hematuria, or incontinence  NEUROLOGICAL: No headaches, memory loss, loss of strength, numbness, or tremors  SKIN: No itching, burning, rashes, or lesions   LYMPH Nodes: No enlarged glands  ENDOCRINE: No heat or cold intolerance; No hair loss  MUSCULOSKELETAL: No joint pain or swelling; No muscle, back, or extremity pain  PSYCHIATRIC: No depression, anxiety, mood swings, or difficulty sleeping  HEME/LYMPH: No easy bruising, or bleeding gums  ALLERY AND IMMUNOLOGIC: No hives or eczema	    [ ] All others negative	  [ ] Unable to obtain    PHYSICAL EXAM:  T(C): 36.2 (09-30-19 @ 08:05), Max: 36.8 (09-29-19 @ 23:50)  HR: 92 (09-30-19 @ 08:05) (71 - 92)  BP: 178/95 (09-30-19 @ 08:05) (149/72 - 178/95)  RR: 16 (09-30-19 @ 08:05) (16 - 18)  SpO2: 100% (09-30-19 @ 08:05) (96% - 100%)  Wt(kg): --  I&O's Summary      Appearance: mildly lethargic, in no pain	  HEENT:   Normal oral mucosa, PERRL, EOMI	  Lymphatic: No lymphadenopathy  Cardiovascular: Normal S1 S2, No JVD, No murmurs, No edema  Respiratory: Lungs clear to auscultation	  Psychiatry: A & O x 0-1 at baseline  Gastrointestinal:  Soft, Non-tender, + BS	  Skin: No rashes, No ecchymoses, No cyanosis	  Neurologic: Non-focal  Extremities: molting of the skin of the extremities  Vascular: Peripheral pulses palpable 1+ bilaterally    MEDICATIONS  (STANDING):  amLODIPine   Tablet 5 milliGRAM(s) Oral daily  carBAMazepine XR Tablet 200 milliGRAM(s) Oral daily  dextrose 5%. 1000 milliLiter(s) (50 mL/Hr) IV Continuous <Continuous>  dextrose 5%. 1000 milliLiter(s) (50 mL/Hr) IV Continuous <Continuous>  dextrose 50% Injectable 12.5 Gram(s) IV Push once  dextrose 50% Injectable 25 Gram(s) IV Push once  dextrose 50% Injectable 25 Gram(s) IV Push once  lamoTRIgine 50 milliGRAM(s) Oral <User Schedule>  metoprolol succinate ER 50 milliGRAM(s) Oral daily  pantoprazole  Injectable 40 milliGRAM(s) IV Push every 12 hours      TELEMETRY: 	    ECG:  	  RADIOLOGY:  OTHER: 	  	  CBC Full  -  ( 30 Sep 2019 06:31 )  WBC Count : 10.58 K/uL  Hemoglobin : 8.9 g/dL  Hematocrit : 29.3 %  Platelet Count - Automated : 68 K/uL  Mean Cell Volume : 94.2 fl  Mean Cell Hemoglobin : 28.6 pg  Mean Cell Hemoglobin Concentration : 30.4 gm/dL  Auto Neutrophil # : 9.40 K/uL  Auto Lymphocyte # : 0.33 K/uL  Auto Monocyte # : 0.73 K/uL  Auto Eosinophil # : 0.03 K/uL  Auto Basophil # : 0.01 K/uL  Auto Neutrophil % : 88.8 %  Auto Lymphocyte % : 3.1 %  Auto Monocyte % : 6.9 %  Auto Eosinophil % : 0.3 %  Auto Basophil % : 0.1 %        CARDIAC MARKERS:                              8.9    10.58 )-----------( 68       ( 30 Sep 2019 06:31 )             29.3       09-30    145  |  118<H>  |  72<H>  ----------------------------<  141<H>  3.9   |  20<L>  |  2.95<H>    Ca    7.6<L>      30 Sep 2019 06:31  Phos  4.1     09-30  Mg     2.6     09-30    TPro  4.8<L>  /  Alb  2.2<L>  /  TBili  1.0  /  DBili  x   /  AST  33  /  ALT  19  /  AlkPhos  67  09-30            proBNP:   Lipid Profile: Cholesterol 99  LDL 43  HDL 28      HgA1c: Hemoglobin A1C, Whole Blood: 5.0 % (09-27 @ 09:28)    TSH: Thyroid Stimulating Hormone, Serum: 3.14 uU/mL (09-27 @ 07:22)
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 Found to have hemoglobin of 7 with +ve occult blood, transfused one unit and hemoglobin trended up to 9. Evaluated by GI and colonoscopy/EGD was deferred at this time due to pt's  advanced age, renal function continues to trend up   pt was made DNR/DNI, with limited medical interventions and no escalation of care as per HCP (spouse)  pt getting progressively lethargic, with mottling of upper and lower extremities, hospice referral made today     OVERNIGHT EVENTS: episoded of hypoglycemia overnight, unable to take PO, received d50 pushes, started on IVF this AM       REVIEW OF SYSTEMS: unable to complete sec to pt;s mental status     T(C): 36.2 (09-30-19 @ 08:05), Max: 36.8 (09-29-19 @ 23:50)  HR: 92 (09-30-19 @ 08:05) (71 - 92)  BP: 178/95 (09-30-19 @ 08:05) (149/72 - 178/95)  RR: 16 (09-30-19 @ 08:05) (16 - 18)  SpO2: 100% (09-30-19 @ 08:05) (96% - 100%)  Wt(kg): --Vital Signs Last 24 Hrs  T(C): 36.2 (30 Sep 2019 08:05), Max: 36.8 (29 Sep 2019 23:50)  T(F): 97.2 (30 Sep 2019 08:05), Max: 98.3 (29 Sep 2019 23:50)  HR: 92 (30 Sep 2019 08:05) (71 - 92)  BP: 178/95 (30 Sep 2019 08:05) (149/72 - 178/95)  BP(mean): --  RR: 16 (30 Sep 2019 08:05) (16 - 18)  SpO2: 100% (30 Sep 2019 08:05) (96% - 100%)    MEDICATIONS  (STANDING):  pantoprazole  Injectable 40 milliGRAM(s) IV Push every 12 hours    MEDICATIONS  (PRN):  glycopyrrolate Injectable 0.4 milliGRAM(s) IV Push every 6 hours PRN secretions  LORazepam   Injectable 1 milliGRAM(s) IV Push every 1 hour PRN Agitation  LORazepam   Injectable 1 milliGRAM(s) IV Push every 1 hour PRN Seizures  morphine  - Injectable 1 milliGRAM(s) IV Push every 1 hour PRN Dyspnea  morphine  - Injectable 1 milliGRAM(s) IV Push every 1 hour PRN Moderate Pain (4 - 6)      PHYSICAL EXAM:  GENERAL: lethargic, shallow breathing   NECK: Supple, No JVD  CHEST/LUNG: Clear to diminished bilaterally; No rales, rhonchi, wheezing, or rubs  HEART: S1, S2, Regular rate and rhythm  ABDOMEN: Soft, Nontender, Nondistended; Bowel sounds present  NEURO: opens eyes to name, minimally responsive,   EXTREMITIES: b/l upper and lower extremity mottling, cool to touch   SKIN: cyanotic finger tips and toes     Consultant(s) Notes Reviewed:  [x ] YES  [ ] NO  Care Discussed with Consultants/Other Providers [ x] YES  [ ] NO    LABS:                        8.9    10.58 )-----------( 68       ( 30 Sep 2019 06:31 )             29.3     09-30    145  |  118<H>  |  72<H>  ----------------------------<  141<H>  3.9   |  20<L>  |  2.95<H>    Ca    7.6<L>      30 Sep 2019 06:31  Phos  4.1     09-30  Mg     2.6     09-30    TPro  4.8<L>  /  Alb  2.2<L>  /  TBili  1.0  /  DBili  x   /  AST  33  /  ALT  19  /  AlkPhos  67  09-30        CAPILLARY BLOOD GLUCOSE      POCT Blood Glucose.: 140 mg/dL (30 Sep 2019 06:22)  POCT Blood Glucose.: 168 mg/dL (30 Sep 2019 01:15)  POCT Blood Glucose.: 27 mg/dL (30 Sep 2019 00:30)  POCT Blood Glucose.: 31 mg/dL (30 Sep 2019 00:21)  POCT Blood Glucose.: 84 mg/dL (29 Sep 2019 16:48)  POCT Blood Glucose.: 108 mg/dL (29 Sep 2019 14:33)      RADIOLOGY & ADDITIONAL TESTS:    < from: CT Head No Cont (09.26.19 @ 14:05) >    EXAM:  CT BRAIN                            PROCEDURE DATE:  09/26/2019          INTERPRETATION:  Head CT without IV contrast     Clinical Indication: altered mental status    Technique: Axial CT images of the head are obtained from the skull base   to the vertex without IV contrast.    Comparison: 11/28/2016.    Findings: There is no acute intracranial hemorrhage. No CT evidence for   an acute territorial infarct. New encephalomalacia in the right occipital   lobe, compatible with chronic infarction. Stable small old lacunar   infarct in the right basal ganglia. Stable patchy hypodensities in the   periventricular and subcortical white matter bilaterally, compatible with   chronic small vessel ischemic disease. There is no space-occupying   lesion, midline shift, or herniation. The ventricles maintain normal   size, shape, and location.  No extra-axial fluid collection. 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 The visualized paranasal sinuses and orbits appear grossly   unremarkable.    Impression: No acute intracranial hemorrhage. No CT evidence for an acute   territorial infarct.    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    If clinically indicated, brain MR and pituitary MR may be pursued for   further evaluation.    Other findings as above.                  < end of copied text >    < from: Xray Chest 1 View- PORTABLE-Urgent (09.26.19 @ 17:24) >    EXAM:  XR CHEST PORTABLE URGENT 1V                            PROCEDURE DATE:  09/26/2019          INTERPRETATION:  INDICATION: Cough, sepsis    PRIORS: 12/1/2016    VIEWS: Portable AP radiography of the chest performed.    FINDINGS: Since prior evaluation there are new airspace opacities   identified within the right lung parenchyma suggesting right-sided   pneumonia. Small right pleural effusion cannot be excluded. There is no   evidence for acute left-sided infiltrate. No left pleural effusion is   identified. There is no evidence for pneumothorax. No mediastinal shift   is noted. Heart size appears within normal limits. Note is made of an   indwelling pacemaker. Fracture deformity of the right humeral head region   identified, representing interval change, indeterminate age. Clinical   correlation is suggested.    IMPRESSION: New airspace opacities right lung parenchyma suggesting   right-sided pneumonia. Small right pleural effusion cannot be excluded.   Fracture deformity of the right humeral head region identified,   representing interval change, indeterminate age. Clinical correlation is   suggested.      < end of copied text >      Imaging Personally Reviewed:  [ ] YES  [ ] NO
PGY 1 Note discussed with supervising resident and primary attending    Patient is a 89y old  Male who presents with a chief complaint of GI bleed (26 Sep 2019 21:46)      INTERVAL HPI/OVERNIGHT EVENTS: No complaints at time of exam. Patient hard of hearing, speaks with a slur, unsure if patient is able to understand questions. Spoke to patient in Cantonese, patient denies any pain in his body, no chest pain, no fevers, chills. Family at bedside to reiterate questions asked.     MEDICATIONS  (STANDING):  amLODIPine   Tablet 5 milliGRAM(s) Oral daily  carBAMazepine XR Tablet 200 milliGRAM(s) Oral daily  lamoTRIgine 50 milliGRAM(s) Oral <User Schedule>  metoprolol succinate ER 50 milliGRAM(s) Oral daily  pantoprazole  Injectable 40 milliGRAM(s) IV Push every 12 hours    MEDICATIONS  (PRN):      __________________________________________________  REVIEW OF SYSTEMS:    CONSTITUTIONAL: No fever,   EYES: no acute visual disturbances  NECK: No pain or stiffness  RESPIRATORY: No cough; No shortness of breath  CARDIOVASCULAR: No chest pain, no palpitations  GASTROINTESTINAL: No pain. No nausea or vomiting; No diarrhea   NEUROLOGICAL: No headache or numbness, no tremors  MUSCULOSKELETAL: No joint pain, no muscle pain  GENITOURINARY: no dysuria, no frequency, no hesitancy  PSYCHIATRY: no depression , no anxiety  ALL OTHER  ROS negative        Vital Signs Last 24 Hrs  T(C): 36.8 (27 Sep 2019 11:19), Max: 37.1 (26 Sep 2019 16:01)  T(F): 98.2 (27 Sep 2019 11:19), Max: 98.8 (26 Sep 2019 16:01)  HR: 86 (27 Sep 2019 11:19) (86 - 95)  BP: 159/98 (27 Sep 2019 11:19) (149/67 - 174/112)  BP(mean): --  RR: 18 (27 Sep 2019 11:19) (18 - 19)  SpO2: 100% (27 Sep 2019 11:19) (99% - 100%)    ________________________________________________  PHYSICAL EXAM:  GENERAL: NAD, frail elderly, speaks with a slur. Hard of hearing bilateral ears.   HEENT: Normocephalic;  conjunctivae and sclerae clear; moist mucous membranes;   NECK : supple  CHEST/LUNG: Clear to auscultation bilaterally with good air entry   HEART: S1 S2  regular; no murmurs, gallops or rubs  ABDOMEN: Soft, Nontender, Nondistended; Bowel sounds present  EXTREMITIES: no cyanosis; no edema; no calf tenderness  SKIN: warm and dry; no rash, poor skin tugor  NERVOUS SYSTEM:  Awake and alert; Oriented  to place, person    _________________________________________________  LABS:                        9.0    12.11 )-----------( 105      ( 27 Sep 2019 07:22 )             29.0         145  |  116<H>  |  84<H>  ----------------------------<  76  4.6   |  16<L>  |  3.57<H>    Ca    8.0<L>      27 Sep 2019 07:22  Phos  6.3       Mg     2.8         TPro  5.5<L>  /  Alb  2.8<L>  /  TBili  1.7<H>  /  DBili  x   /  AST  53<H>  /  ALT  33  /  AlkPhos  87        Urinalysis Basic - ( 27 Sep 2019 09:43 )    Color: Yellow / Appearance: Clear / S.015 / pH: x  Gluc: x / Ketone: Negative  / Bili: Negative / Urobili: Negative   Blood: x / Protein: 15 / Nitrite: Negative   Leuk Esterase: Negative / RBC: 0-2 /HPF / WBC 0-2 /HPF   Sq Epi: x / Non Sq Epi: x / Bacteria: x      CAPILLARY BLOOD GLUCOSE            RADIOLOGY & ADDITIONAL TESTS:    Imaging Personally Reviewed:  YES    Consultant(s) Notes Reviewed:   YES    Care Discussed with Consultants :     Plan of care was discussed with patient and /or primary care giver; all questions and concerns were addressed and care was aligned with patient's wishes.
[   ] ICU                                          [   ] CCU                                      [ X  ] Medical Floor      Patient is comfortable. No new complaints reported, No abdominal pain, N/V, hematemesis, hematochezia, melena, fever, chills, chest pain, SOB, cough or diarrhea reported.        VITALS  Vital Signs Last 24 Hrs  T(C): 36.1 (29 Sep 2019 07:50), Max: 36.4 (29 Sep 2019 07:49)  T(F): 97 (29 Sep 2019 07:50), Max: 97.6 (29 Sep 2019 07:49)  HR: 64 (29 Sep 2019 07:50) (54 - 66)  BP: 153/72 (29 Sep 2019 07:50) (131/63 - 158/74)   RR: 16 (29 Sep 2019 07:50) (16 - 17)  SpO2: 95% (29 Sep 2019 07:50) (94% - 97%)           MEDICATIONS  (STANDING):  amLODIPine   Tablet 5 milliGRAM(s) Oral daily  carBAMazepine XR Tablet 200 milliGRAM(s) Oral daily  dextrose 5%. 1000 milliLiter(s) (50 mL/Hr) IV Continuous <Continuous>  dextrose 5%. 1000 milliLiter(s) (50 mL/Hr) IV Continuous <Continuous>  dextrose 50% Injectable 12.5 Gram(s) IV Push once  dextrose 50% Injectable 25 Gram(s) IV Push once  dextrose 50% Injectable 25 Gram(s) IV Push once  lamoTRIgine 50 milliGRAM(s) Oral <User Schedule>  metoprolol succinate ER 50 milliGRAM(s) Oral daily  pantoprazole  Injectable 40 milliGRAM(s) IV Push every 12 hours    MEDICATIONS  (PRN):  acetaminophen   Tablet .. 650 milliGRAM(s) Oral every 6 hours PRN Moderate Pain (4 - 6)  dextrose 40% Gel 15 Gram(s) Oral once PRN Blood Glucose LESS THAN 70 milliGRAM(s)/deciLiter  glucagon  Injectable 1 milliGRAM(s) IntraMuscular once PRN Glucose <70 milliGRAM(s)/deciLiter  traMADol 25 milliGRAM(s) Oral every 8 hours PRN Severe Pain (7 - 10)                            9.0    10.95 )-----------( 78       ( 29 Sep 2019 07:01 )             29.4       09-29    147<H>  |  119<H>  |  84<H>  ----------------------------<  177<H>  3.5   |  21<L>  |  3.59<H>    Ca    7.9<L>      29 Sep 2019 07:01  Phos  6.7     09-28  Mg     2.9     09-28    TPro  5.5<L>  /  Alb  2.8<L>  /  TBili  1.1  /  DBili  x   /  AST  31  /  ALT  25  /  AlkPhos  81  09-29
CHIEF COMPLAINT:Patient is a 89y old  Male who presents with a chief complaint of GI bleed (29 Sep 2019 12:51)    	  REVIEW OF SYSTEMS:  CONSTITUTIONAL: No fever, weight loss, or fatigue  EYES: No eye pain, visual disturbances, or discharge  ENMT:  No difficulty hearing, tinnitus, vertigo; No sinus or throat pain  NECK: No pain or stiffness  RESPIRATORY: No cough, wheezing, chills or hemoptysis; No Shortness of Breath  CARDIOVASCULAR: No chest pain, palpitations, passing out, dizziness, or leg swelling  GASTROINTESTINAL: No abdominal or epigastric pain. No nausea, vomiting, or hematemesis; No diarrhea or constipation. No melena or hematochezia.  GENITOURINARY: No dysuria, frequency, hematuria, or incontinence  NEUROLOGICAL: No headaches, memory loss, loss of strength, numbness, or tremors  SKIN: No itching, burning, rashes, or lesions   LYMPH Nodes: No enlarged glands  ENDOCRINE: No heat or cold intolerance; No hair loss  MUSCULOSKELETAL: No joint pain or swelling; No muscle, back, or extremity pain  PSYCHIATRIC: No depression, anxiety, mood swings, or difficulty sleeping  HEME/LYMPH: No easy bruising, or bleeding gums  ALLERY AND IMMUNOLOGIC: No hives or eczema	    [ ] All others negative	  [ ] Unable to obtain    PHYSICAL EXAM:  T(C): 36.2 (09-29-19 @ 15:49), Max: 36.4 (09-29-19 @ 07:49)  HR: 81 (09-29-19 @ 15:49) (58 - 81)  BP: 149/72 (09-29-19 @ 15:49) (131/63 - 158/74)  RR: 18 (09-29-19 @ 15:49) (16 - 18)  SpO2: 96% (09-29-19 @ 15:49) (94% - 97%)  Wt(kg): --  I&O's Summary    28 Sep 2019 07:01  -  29 Sep 2019 07:00  --------------------------------------------------------  IN: 0 mL / OUT: 475 mL / NET: -475 mL        Appearance: Normal	  HEENT:   Normal oral mucosa, PERRL, EOMI	  Lymphatic: No lymphadenopathy  Cardiovascular: Normal S1 S2, No JVD, No murmurs, No edema  Respiratory: Lungs clear to auscultation	  Psychiatry: A & O x 3, Mood & affect appropriate  Gastrointestinal:  Soft, Non-tender, + BS	  Skin: No rashes, No ecchymoses, No cyanosis	  Neurologic: Non-focal  Extremities: Normal range of motion, No clubbing, cyanosis or edema  Vascular: Peripheral pulses palpable 2+ bilaterally    MEDICATIONS  (STANDING):  amLODIPine   Tablet 5 milliGRAM(s) Oral daily  carBAMazepine XR Tablet 200 milliGRAM(s) Oral daily  dextrose 5%. 1000 milliLiter(s) (50 mL/Hr) IV Continuous <Continuous>  dextrose 5%. 1000 milliLiter(s) (50 mL/Hr) IV Continuous <Continuous>  dextrose 50% Injectable 12.5 Gram(s) IV Push once  dextrose 50% Injectable 25 Gram(s) IV Push once  dextrose 50% Injectable 25 Gram(s) IV Push once  lamoTRIgine 50 milliGRAM(s) Oral <User Schedule>  metoprolol succinate ER 50 milliGRAM(s) Oral daily  pantoprazole  Injectable 40 milliGRAM(s) IV Push every 12 hours      TELEMETRY: 	    ECG:  	  RADIOLOGY:  OTHER: 	  	  CBC Full  -  ( 29 Sep 2019 07:01 )  WBC Count : 10.95 K/uL  Hemoglobin : 9.0 g/dL  Hematocrit : 29.4 %  Platelet Count - Automated : 78 K/uL  Mean Cell Volume : 92.7 fl  Mean Cell Hemoglobin : 28.4 pg  Mean Cell Hemoglobin Concentration : 30.6 gm/dL  Auto Neutrophil # : 9.87 K/uL  Auto Lymphocyte # : 0.30 K/uL  Auto Monocyte # : 0.62 K/uL  Auto Eosinophil # : 0.03 K/uL  Auto Basophil # : 0.01 K/uL  Auto Neutrophil % : 90.1 %  Auto Lymphocyte % : 2.7 %  Auto Monocyte % : 5.7 %  Auto Eosinophil % : 0.3 %  Auto Basophil % : 0.1 %        CARDIAC MARKERS:                              9.0    10.95 )-----------( 78       ( 29 Sep 2019 07:01 )             29.4       09-29    147<H>  |  119<H>  |  84<H>  ----------------------------<  177<H>  3.5   |  21<L>  |  3.59<H>    Ca    7.9<L>      29 Sep 2019 07:01  Phos  6.7     09-28  Mg     2.9     09-28    TPro  5.5<L>  /  Alb  2.8<L>  /  TBili  1.1  /  DBili  x   /  AST  31  /  ALT  25  /  AlkPhos  81  09-29            proBNP:   Lipid Profile: Cholesterol 99  LDL 43  HDL 28      HgA1c: Hemoglobin A1C, Whole Blood: 5.0 % (09-27 @ 09:28)    TSH: Thyroid Stimulating Hormone, Serum: 3.14 uU/mL (09-27 @ 07:22)
[   ] ICU                                          [   ] CCU                                      [ x  ] Medical Floor      Patient is comfortable. No new complaints reported, No abdominal pain, N/V, hematemesis, hematochezia, melena, fever, chills, chest pain, SOB, cough or diarrhea reported.      VITALS  Vital Signs Last 24 Hrs  T(C): 36.2 (30 Sep 2019 08:05), Max: 36.8 (29 Sep 2019 23:50)  T(F): 97.2 (30 Sep 2019 08:05), Max: 98.3 (29 Sep 2019 23:50)  HR: 92 (30 Sep 2019 08:05) (71 - 92)  BP: 178/95 (30 Sep 2019 08:05) (149/72 - 178/95)   RR: 16 (30 Sep 2019 08:05) (16 - 18)  SpO2: 100% (30 Sep 2019 08:05) (96% - 100%)         MEDICATIONS  (STANDING):  amLODIPine   Tablet 5 milliGRAM(s) Oral daily  carBAMazepine XR Tablet 200 milliGRAM(s) Oral daily  dextrose 5%. 1000 milliLiter(s) (50 mL/Hr) IV Continuous <Continuous>  dextrose 5%. 1000 milliLiter(s) (50 mL/Hr) IV Continuous <Continuous>  dextrose 50% Injectable 12.5 Gram(s) IV Push once  dextrose 50% Injectable 25 Gram(s) IV Push once  dextrose 50% Injectable 25 Gram(s) IV Push once  lamoTRIgine 50 milliGRAM(s) Oral <User Schedule>  metoprolol succinate ER 50 milliGRAM(s) Oral daily  pantoprazole  Injectable 40 milliGRAM(s) IV Push every 12 hours    MEDICATIONS  (PRN):  acetaminophen   Tablet .. 650 milliGRAM(s) Oral every 6 hours PRN Moderate Pain (4 - 6)  dextrose 40% Gel 15 Gram(s) Oral once PRN Blood Glucose LESS THAN 70 milliGRAM(s)/deciLiter  glucagon  Injectable 1 milliGRAM(s) IntraMuscular once PRN Glucose <70 milliGRAM(s)/deciLiter  traMADol 25 milliGRAM(s) Oral every 8 hours PRN Severe Pain (7 - 10)                            8.9    10.58 )-----------( 68       ( 30 Sep 2019 06:31 )             29.3       09-30    145  |  118<H>  |  72<H>  ----------------------------<  141<H>  3.9   |  20<L>  |  2.95<H>    Ca    7.6<L>      30 Sep 2019 06:31  Phos  4.1     09-30  Mg     2.6     09-30    TPro  4.8<L>  /  Alb  2.2<L>  /  TBili  1.0  /  DBili  x   /  AST  33  /  ALT  19  /  AlkPhos  67  09-30
CHIEF COMPLAINT:Patient is a 89y old  Male who presents with a chief complaint of GI bleed (27 Sep 2019 18:14)    	  REVIEW OF SYSTEMS:  CONSTITUTIONAL: No fever, weight loss, or fatigue  EYES: No eye pain, visual disturbances, or discharge  ENMT:  No difficulty hearing, tinnitus, vertigo; No sinus or throat pain  NECK: No pain or stiffness  RESPIRATORY: No cough, wheezing, chills or hemoptysis; No Shortness of Breath  CARDIOVASCULAR: No chest pain, palpitations, passing out, dizziness, or leg swelling  GASTROINTESTINAL: No abdominal or epigastric pain. No nausea, vomiting, or hematemesis; No diarrhea or constipation. No melena or hematochezia.  GENITOURINARY: No dysuria, frequency, hematuria, or incontinence  NEUROLOGICAL: No headaches, memory loss, loss of strength, numbness, or tremors  SKIN: No itching, burning, rashes, or lesions   LYMPH Nodes: No enlarged glands  ENDOCRINE: No heat or cold intolerance; No hair loss  MUSCULOSKELETAL: No joint pain or swelling; No muscle, back, or extremity pain  PSYCHIATRIC: No depression, anxiety, mood swings, or difficulty sleeping  HEME/LYMPH: No easy bruising, or bleeding gums  ALLERY AND IMMUNOLOGIC: No hives or eczema	    [ ] All others negative	  [ ] Unable to obtain    PHYSICAL EXAM:  T(C): 36.2 (19 @ 15:48), Max: 36.3 (19 @ 00:33)  HR: 54 (19 @ 15:48) (54 - 109)  BP: 140/68 (19 @ 15:48) (138/75 - 158/97)  RR: 16 (19 @ 15:48) (14 - 16)  SpO2: 95% (19 @ 15:48) (95% - 96%)  Wt(kg): --  I&O's Summary      Appearance: Normal	  HEENT:   Normal oral mucosa, PERRL, EOMI	  Lymphatic: No lymphadenopathy  Cardiovascular: Normal S1 S2, No JVD, No murmurs, No edema  Respiratory: Lungs clear to auscultation	  Psychiatry: A & O x 3, Mood & affect appropriate  Gastrointestinal:  Soft, Non-tender, + BS	  Skin: No rashes, No ecchymoses, No cyanosis	  Neurologic: Non-focal  Extremities: Normal range of motion, No clubbing, cyanosis or edema  Vascular: Peripheral pulses palpable 2+ bilaterally    MEDICATIONS  (STANDING):  amLODIPine   Tablet 5 milliGRAM(s) Oral daily  carBAMazepine XR Tablet 200 milliGRAM(s) Oral daily  dextrose 5%. 1000 milliLiter(s) (50 mL/Hr) IV Continuous <Continuous>  dextrose 50% Injectable 12.5 Gram(s) IV Push once  dextrose 50% Injectable 25 Gram(s) IV Push once  dextrose 50% Injectable 25 Gram(s) IV Push once  lamoTRIgine 50 milliGRAM(s) Oral <User Schedule>  metoprolol succinate ER 50 milliGRAM(s) Oral daily  pantoprazole  Injectable 40 milliGRAM(s) IV Push every 12 hours      TELEMETRY: 	    ECG:  	  RADIOLOGY:  OTHER: 	  	  CBC Full  -  ( 28 Sep 2019 05:51 )  WBC Count : 12.35 K/uL  Hemoglobin : 8.9 g/dL  Hematocrit : 28.6 %  Platelet Count - Automated : 106 K/uL  Mean Cell Volume : 93.2 fl  Mean Cell Hemoglobin : 29.0 pg  Mean Cell Hemoglobin Concentration : 31.1 gm/dL  Auto Neutrophil # : 11.15 K/uL  Auto Lymphocyte # : 0.20 K/uL  Auto Monocyte # : 0.78 K/uL  Auto Eosinophil # : 0.01 K/uL  Auto Basophil # : 0.01 K/uL  Auto Neutrophil % : 90.3 %  Auto Lymphocyte % : 1.6 %  Auto Monocyte % : 6.3 %  Auto Eosinophil % : 0.1 %  Auto Basophil % : 0.1 %        CARDIAC MARKERS:                              8.9    12.35 )-----------( 106      ( 28 Sep 2019 05:51 )             28.6           148<H>  |  120<H>  |  87<H>  ----------------------------<  66<L>  4.2   |  15<L>  |  3.94<H>    Ca    7.9<L>      28 Sep 2019 05:51  Phos  6.7       Mg     2.9         TPro  5.5<L>  /  Alb  2.8<L>  /  TBili  1.1  /  DBili  x   /  AST  48<H>  /  ALT  29  /  AlkPhos  82            Urinalysis Basic - ( 27 Sep 2019 09:43 )    Color: Yellow / Appearance: Clear / S.015 / pH: x  Gluc: x / Ketone: Negative  / Bili: Negative / Urobili: Negative   Blood: x / Protein: 15 / Nitrite: Negative   Leuk Esterase: Negative / RBC: 0-2 /HPF / WBC 0-2 /HPF   Sq Epi: x / Non Sq Epi: x / Bacteria: x      proBNP:   Lipid Profile: Cholesterol 99  LDL 43  HDL 28      HgA1c: Hemoglobin A1C, Whole Blood: 5.0 % ( @ 09:28)    TSH: Thyroid Stimulating Hormone, Serum: 3.14 uU/mL ( @ 07:22)

## 2019-09-30 NOTE — PROGRESS NOTE ADULT - PROVIDER SPECIALTY LIST ADULT
Gastroenterology
Internal Medicine
Gastroenterology
Gastroenterology

## 2019-09-30 NOTE — PROGRESS NOTE ADULT - ASSESSMENT
89 y/o male admitted to [facility] with symptomatic anemia and elevated cardiac enzymes. Found to have hemoglobin of 7 in the emergency room, transfused one unit and hemoglobin trended up to 9. Patient admitted for further management of symptomatic anemia likely secondary to GI bleed as patient has occult blood positive. Unlikely to have colonoscopy/EGD performed at this point as patient is 89 years old.
89 y/o male admitted to [facility] with symptomatic anemia and elevated cardiac enzymes. Found to have hemoglobin of 7 in the emergency room, transfused one unit and hemoglobin trended up to 9. Patient admitted for further management of symptomatic anemia likely secondary to GI bleed as patient has occult blood positive. Unlikely to have colonoscopy/EGD performed at this point as patient is 89 years old.
89 y/o male from home admitted for GIB
89 y/o male admitted to [facility] with symptomatic anemia and elevated cardiac enzymes. Found to have hemoglobin of 7 in the emergency room, transfused one unit and hemoglobin trended up to 9. Patient admitted for further management of symptomatic anemia likely secondary to GI bleed as patient has occult blood positive. Unlikely to have colonoscopy/EGD performed at this point as patient is 89 years old.
89 y/o male admitted to [facility] with symptomatic anemia and elevated cardiac enzymes. Found to have hemoglobin of 7 in the emergency room, transfused one unit and hemoglobin trended up to 9. Patient admitted for further management of symptomatic anemia likely secondary to GI bleed as patient has occult blood positive. Unlikely to have colonoscopy/EGD performed at this point as patient is 89 years old.
1. Anemia H/H stable  2. Positive occult blood in stool  3. No evidence of acute GI bleeding    Suggestions:    1. Monitor H/H  2. Transfuse PRBC as needed  3. Protonix daily  4. Avoid NSAID  5. DVT prophylaxis
1. Anemia H/H stable  2. Positive occult blood in stool  3. No evidence of acute GI bleeding    Suggestions:    1. Monitor H/H  2. Transfuse PRBC as needed  3. Protonix daily  4. Avoid NSAID  5. Check CEA  6. DVT prophylaxis  7. Hematology evaluation  8. High risk for EGD and colonoscopy
1. Anemia  2. Positive occult blood in stool  3. No evidence of acute GI bleeding    Suggestions:    1. Monitor H/H  2. Transfuse PRBC as needed  3. Protonix daily  4. Avoid NSAID  5. Check CEA  6. DVT prophylaxis  7. Hemotology evaluation

## 2019-09-30 NOTE — PROGRESS NOTE ADULT - RS GEN PE MLT RESP DETAILS PC
no rales/good air movement/no rhonchi/airway patent/breath sounds equal/respirations non-labored
breath sounds equal/good air movement/airway patent/no rhonchi/no rales
no wheezes/airway patent/breath sounds equal

## 2019-09-30 NOTE — PROGRESS NOTE ADULT - PROBLEM SELECTOR PLAN 3
troponin 0.090 on admission  t2 .085, t3 nopt sent  EKG showed sinus tachy, normal rate at the moment  Will d/c tele as elevated trops is likely secondary to demand ischemia.  f/u TTE  holding asa for GI bleed  starting statin
-hx of bradycardia, has a PPM   -PO meds are being d/c'd due to pt's worsening clinical status
troponin 0.090 on admission  t2 .085, t3 nopt sent  EKG showed sinus tachy, normal rate at the moment  Will d/c tele as elevated trops is likely secondary to demand ischemia.  f/u TTE  holding asa for GI bleed  starting statin

## 2019-09-30 NOTE — PROGRESS NOTE ADULT - PROBLEM SELECTOR PLAN 1
p/w fatigue and cp x2 days  H/H 7.0/23.3 on admission - markedly lower than in 2016  s/p 1 unit pRBC with hemoglobin correcting to 9 this AM  occult+  f/u iron studies- ferritin elevated, serum iron normal, TIBC decreased.   -Continue to trend CBC  monitor vitals closely  protonix iv push bid  GI consulted - Dr. Delacruz- unlikely to perform colonoscopy/EGD at this time, patient to start dysphagia diet, thin liquids. Continue medical management
-p/w CP and fatigue at home, H/H 7.0/23.3 on admission likelyy due to GIB   -s/p 1 unit PRBC   -positive occult    -cont PPI   -GI Dr. Delacruz  -not a candidate for EGD/Colonoscopy due to advanced age   -comfort feeds if mental status improves   -no further tests/imaging studies or procedures as per HCP
p/w fatigue and cp x2 days  H/H 7.0/23.3 on admission - markedly lower than in 2016  s/p 1 unit pRBC   occult+  f/u iron studies- ferritin elevated, serum iron normal, TIBC decreased.   -Continue to trend CBC  monitor vitals closely  protonix iv push bid  GI consulted - Dr. Delacruz- unlikely to perform colonoscopy/EGD at this time, patient to start dysphagia diet, thin liquids. Continue medical management
p/w fatigue and cp x2 days  H/H 7.0/23.3 on admission - markedly lower than in 2016  s/p 1 unit pRBC with hemoglobin correcting to 9 this AM  occult+  f/u iron studies- ferritin elevated, serum iron normal, TIBC decreased.   -Continue to trend CBC  monitor vitals closely  protonix iv push bid  GI consulted - Dr. Delacruz- unlikely to perform colonoscopy/EGD at this time, patient to start dysphagia diet, thin liquids. Continue medical management
p/w fatigue and cp x2 days  H/H 7.0/23.3 on admission - markedly lower than in 2016  s/p 1 unit pRBC   occult+  f/u iron studies- ferritin elevated, serum iron normal, TIBC decreased.   -Continue to trend CBC  monitor vitals closely  protonix iv push bid  GI consulted - Dr. Delacruz- unlikely to perform colonoscopy/EGD at this time, patient to start dysphagia diet, thin liquids. Continue medical management

## 2019-09-30 NOTE — CONSULT NOTE ADULT - SUBJECTIVE AND OBJECTIVE BOX
HPI:  89 y/o male from home ambulates independently and sometimes with walker, with PMHx of trigeminal neuralgia, bradycardia s/p PPM ([employer]) presents to the ED with chief complaint of lethargy and poor appetite x2 days. As per wife, patient has been eating minimally for 2 days and not as active as he normally is, prompting her to bring him to the ED via ambulance. She report she has had intermittent chest pain during this time. He denies any nausea, vomiting, melena or any other symptoms at this time. Patient reports wanting to go home and is in no acute distress.    GOC: DNR/DNI with no aggressive measures to be taken. (26 Sep 2019 17:50)      PAST MEDICAL & SURGICAL HISTORY:  Shingles  Pacemaker  Trigeminal neuralgia  No significant past surgical history      SOCIAL HISTORY:    Admitted from:  home assisted living [facility]   Substance abuse history:              Tobacco hx:                  Alcohol hx:              Home Opioid hx:  Baptist:                                    Preferred Language:    Surrogate/HCP/Guardian:            Phone#:    FAMILY HISTORY:    Baseline ADLs (prior to admission):    Allergies    No Known Allergies    Intolerances      Present Symptoms:   Dyspnea:   Nausea/Vomiting:   Anxiety:  Depressed   Fatigue:  Loss of appetite:   Pain:                                location:          Review of Systems: [All others negative or Unable to obtain due to poor mentation]    MEDICATIONS  (STANDING):  amLODIPine   Tablet 5 milliGRAM(s) Oral daily  carBAMazepine XR Tablet 200 milliGRAM(s) Oral daily  dextrose 5%. 1000 milliLiter(s) (50 mL/Hr) IV Continuous <Continuous>  dextrose 5%. 1000 milliLiter(s) (50 mL/Hr) IV Continuous <Continuous>  dextrose 50% Injectable 12.5 Gram(s) IV Push once  dextrose 50% Injectable 25 Gram(s) IV Push once  dextrose 50% Injectable 25 Gram(s) IV Push once  lamoTRIgine 50 milliGRAM(s) Oral <User Schedule>  metoprolol succinate ER 50 milliGRAM(s) Oral daily  pantoprazole  Injectable 40 milliGRAM(s) IV Push every 12 hours    MEDICATIONS  (PRN):  acetaminophen   Tablet .. 650 milliGRAM(s) Oral every 6 hours PRN Moderate Pain (4 - 6)  dextrose 40% Gel 15 Gram(s) Oral once PRN Blood Glucose LESS THAN 70 milliGRAM(s)/deciLiter  glucagon  Injectable 1 milliGRAM(s) IntraMuscular once PRN Glucose <70 milliGRAM(s)/deciLiter  traMADol 25 milliGRAM(s) Oral every 8 hours PRN Severe Pain (7 - 10)      PHYSICAL EXAM:    Vital Signs Last 24 Hrs  T(C): 36.2 (30 Sep 2019 08:05), Max: 36.8 (29 Sep 2019 23:50)  T(F): 97.2 (30 Sep 2019 08:05), Max: 98.3 (29 Sep 2019 23:50)  HR: 92 (30 Sep 2019 08:05) (71 - 92)  BP: 178/95 (30 Sep 2019 08:05) (149/72 - 178/95)  BP(mean): --  RR: 16 (30 Sep 2019 08:05) (16 - 18)  SpO2: 100% (30 Sep 2019 08:05) (96% - 100%)    General: alert  oriented x ____    [lethargic distressed cachexia  nonverbal  unarousable verbal]  Karnofsky Performance Score/Palliative Performance Status Version2:     %    HEENT: normal  dry mouth  ET tube/trach oral lesions:  Lungs: comfortable tachypnea/labored breathing  excessive secretions  CV: normal  tachycardia  GI: normal  distended  tender  incontinent               PEG/NG/OG tube  constipation  last BM:   : normal  incontinent  oliguria/anuria  mas  Musculoskeletal: normal  weakness  edema             ambulatory  bedbound/wheelchair bound  Skin: normal  pressure ulcers: stage: edema: other:  Neuro: no deficits cognitive impairment dsyphagia/dysarthria paresis: other:  Oral intake ability: unable/only mouth care [minimal moderate full capability]  Diet: [NPO]    LABS:                        8.9    10.58 )-----------( 68       ( 30 Sep 2019 06:31 )             29.3     09-30    145  |  118<H>  |  72<H>  ----------------------------<  141<H>  3.9   |  20<L>  |  2.95<H>    Ca    7.6<L>      30 Sep 2019 06:31  Phos  4.1     09-30  Mg     2.6     09-30    TPro  4.8<L>  /  Alb  2.2<L>  /  TBili  1.0  /  DBili  x   /  AST  33  /  ALT  19  /  AlkPhos  67  09-30        RADIOLOGY & ADDITIONAL STUDIES:    ADVANCE DIRECTIVES: HPI:  89 y/o male from home ambulates independently and sometimes with walker, with PMHx of trigeminal neuralgia, bradycardia s/p PPM ([employer]) presents to the ED with chief complaint of lethargy and poor appetite x2 days. As per wife, patient has been eating minimally for 2 days and not as active as he normally is, prompting her to bring him to the ED via ambulance. She report she has had intermittent chest pain during this time. He denies any nausea, vomiting, melena or any other symptoms at this time. Patient reports wanting to go home and is in no acute distress.      Interval hx: Patient seen and examined at the bedside, lethargic.  Wife at the bedside.  DNR/DNI on file.  Palliative care to evaluate hospice eligibility     PAST MEDICAL & SURGICAL HISTORY:  Shingles  Pacemaker  Trigeminal neuralgia  No significant past surgical history      SOCIAL HISTORY:    Admitted from: Home  Patient lives at home with his wife.  Patient has 2 daughters.     Anabaptism:    Tenriism                                Preferred Language: English    Surrogate/HCP/Guardian:   Barrett Castañeda (spouse)          Phone#: (c) [phone]; (h) [phone]                                          Licha Espinoza (dtr)                 Phone# [phone]    FAMILY HISTORY:  Pt unable to participate  Baseline ADLs (prior to admission): Dependent    Allergies    No Known Allergies    Intolerances      Present Symptoms:   Unable to obtain due to poor mentation    MEDICATIONS  (STANDING):  amLODIPine   Tablet 5 milliGRAM(s) Oral daily  carBAMazepine XR Tablet 200 milliGRAM(s) Oral daily  dextrose 5%. 1000 milliLiter(s) (50 mL/Hr) IV Continuous <Continuous>  dextrose 5%. 1000 milliLiter(s) (50 mL/Hr) IV Continuous <Continuous>  dextrose 50% Injectable 12.5 Gram(s) IV Push once  dextrose 50% Injectable 25 Gram(s) IV Push once  dextrose 50% Injectable 25 Gram(s) IV Push once  lamoTRIgine 50 milliGRAM(s) Oral <User Schedule>  metoprolol succinate ER 50 milliGRAM(s) Oral daily  pantoprazole  Injectable 40 milliGRAM(s) IV Push every 12 hours    MEDICATIONS  (PRN):  acetaminophen   Tablet .. 650 milliGRAM(s) Oral every 6 hours PRN Moderate Pain (4 - 6)  dextrose 40% Gel 15 Gram(s) Oral once PRN Blood Glucose LESS THAN 70 milliGRAM(s)/deciLiter  glucagon  Injectable 1 milliGRAM(s) IntraMuscular once PRN Glucose <70 milliGRAM(s)/deciLiter  traMADol 25 milliGRAM(s) Oral every 8 hours PRN Severe Pain (7 - 10)      PHYSICAL EXAM:    Vital Signs Last 24 Hrs  T(C): 36.2 (30 Sep 2019 08:05), Max: 36.8 (29 Sep 2019 23:50)  T(F): 97.2 (30 Sep 2019 08:05), Max: 98.3 (29 Sep 2019 23:50)  HR: 92 (30 Sep 2019 08:05) (71 - 92)  BP: 178/95 (30 Sep 2019 08:05) (149/72 - 178/95)  BP(mean): --  RR: 16 (30 Sep 2019 08:05) (16 - 18)  SpO2: 100% (30 Sep 2019 08:05) (96% - 100%)    General: Lethargic, alert to name,  [place], dyspnea  Karnofsky Performance Score/Palliative Performance Status Version2: 20    %    HEENT: NCAT, xerostomia  Lungs: dyspnea, on NC  CV: S1S2, RRR  GI: soft, non tender  : urinating  Musculoskeletal:  no edema  Skin: mottling noted to b/l UE  Neuro: unable to follow commands  Oral intake ability: poor po intake  Diet: [NPO]    LABS:                        8.9    10.58 )-----------( 68       ( 30 Sep 2019 06:31 )             29.3     09-30    145  |  118<H>  |  72<H>  ----------------------------<  141<H>  3.9   |  20<L>  |  2.95<H>    Ca    7.6<L>      30 Sep 2019 06:31  Phos  4.1     09-30  Mg     2.6     09-30    TPro  4.8<L>  /  Alb  2.2<L>  /  TBili  1.0  /  DBili  x   /  AST  33  /  ALT  19  /  AlkPhos  67  09-30        RADIOLOGY & ADDITIONAL STUDIES: Reviewed    ADVANCE DIRECTIVES: MOLST; DNR/DNI/Comfort measures only HPI:  89 y/o male from home ambulates independently and sometimes with walker, with PMHx of trigeminal neuralgia, bradycardia s/p PPM ([employer]) presents to the ED with chief complaint of lethargy and poor appetite x2 days. As per wife, patient has been eating minimally for 2 days and not as active as he normally is, prompting her to bring him to the ED via ambulance. She report she has had intermittent chest pain during this time. He denies any nausea, vomiting, melena or any other symptoms at this time. Patient reports wanting to go home and is in no acute distress.      Interval hx: Patient seen and examined at the bedside, lethargic.  Wife at the bedside.  DNR/DNI on file.  Palliative care to evaluate hospice eligibility     PAST MEDICAL & SURGICAL HISTORY:  Shingles  Pacemaker  Trigeminal neuralgia  No significant past surgical history      SOCIAL HISTORY:    Admitted from: Home  Patient lives at home with his wife.  Patient has 2 daughters.     Orthodoxy:    Quaker                                Preferred Language: English    Surrogate/HCP/Guardian:   Barrett Castañeda (spouse)          Phone#: (c) [phone]; (h) [phone]                                          Licha Espinoza (dtr)                 Phone# [phone]    FAMILY HISTORY:  Pt unable to participate  Baseline ADLs (prior to admission): Dependent    Allergies    No Known Allergies    Intolerances      Present Symptoms:   Unable to obtain due to poor mentation    MEDICATIONS  (STANDING):  amLODIPine   Tablet 5 milliGRAM(s) Oral daily  carBAMazepine XR Tablet 200 milliGRAM(s) Oral daily  dextrose 5%. 1000 milliLiter(s) (50 mL/Hr) IV Continuous <Continuous>  dextrose 5%. 1000 milliLiter(s) (50 mL/Hr) IV Continuous <Continuous>  dextrose 50% Injectable 12.5 Gram(s) IV Push once  dextrose 50% Injectable 25 Gram(s) IV Push once  dextrose 50% Injectable 25 Gram(s) IV Push once  lamoTRIgine 50 milliGRAM(s) Oral <User Schedule>  metoprolol succinate ER 50 milliGRAM(s) Oral daily  pantoprazole  Injectable 40 milliGRAM(s) IV Push every 12 hours    MEDICATIONS  (PRN):  acetaminophen   Tablet .. 650 milliGRAM(s) Oral every 6 hours PRN Moderate Pain (4 - 6)  dextrose 40% Gel 15 Gram(s) Oral once PRN Blood Glucose LESS THAN 70 milliGRAM(s)/deciLiter  glucagon  Injectable 1 milliGRAM(s) IntraMuscular once PRN Glucose <70 milliGRAM(s)/deciLiter  traMADol 25 milliGRAM(s) Oral every 8 hours PRN Severe Pain (7 - 10)      PHYSICAL EXAM:    Vital Signs Last 24 Hrs  T(C): 36.2 (30 Sep 2019 08:05), Max: 36.8 (29 Sep 2019 23:50)  T(F): 97.2 (30 Sep 2019 08:05), Max: 98.3 (29 Sep 2019 23:50)  HR: 92 (30 Sep 2019 08:05) (71 - 92)  BP: 178/95 (30 Sep 2019 08:05) (149/72 - 178/95)  BP(mean): --  RR: 16 (30 Sep 2019 08:05) (16 - 18)  SpO2: 100% (30 Sep 2019 08:05) (96% - 100%)    General: Lethargic, alert to name,  [place], dyspnea  Karnofsky Performance Score/Palliative Performance Status Version2: 20    %    HEENT: NCAT, xerostomia, elderly   Lungs: dyspnea, on NC  CV: S1S2, RRR  GI: soft, non tender and non distended  : incontinent  Musculoskeletal:  no edema, weakness x4  Skin: mottling noted to b/l UE - tenderness and swelling of right arm  Neuro: unable to follow commands, severely cognitively impaired  Oral intake ability: poor po intake  Diet: [NPO]    LABS:                        8.9    10.58 )-----------( 68       ( 30 Sep 2019 06:31 )             29.3     09-30    145  |  118<H>  |  72<H>  ----------------------------<  141<H>  3.9   |  20<L>  |  2.95<H>    Ca    7.6<L>      30 Sep 2019 06:31  Phos  4.1     09-30  Mg     2.6     09-30    TPro  4.8<L>  /  Alb  2.2<L>  /  TBili  1.0  /  DBili  x   /  AST  33  /  ALT  19  /  AlkPhos  67  09-30        RADIOLOGY & ADDITIONAL STUDIES: Reviewed    ADVANCE DIRECTIVES: MOLST; DNR/DNI/Comfort measures only HPI:  89 y/o male from home ambulates independently and sometimes with walker, with PMHx of trigeminal neuralgia, bradycardia s/p PPM ([employer]) presents to the ED with chief complaint of lethargy and poor appetite x2 days. As per wife, patient has been eating minimally for 2 days and not as active as he normally is, prompting her to bring him to the ED via ambulance. She report she has had intermittent chest pain during this time. He denies any nausea, vomiting, melena or any other symptoms at this time. Patient reports wanting to go home and is in no acute distress.      Interval hx: Patient seen and examined at the bedside, lethargic. Recent TTE shows severe pulmonary hypertension.  Wife at the bedside.  DNR/DNI on file.  Palliative care to evaluate hospice eligibility     PAST MEDICAL & SURGICAL HISTORY:  Shingles  Pacemaker  Trigeminal neuralgia  No significant past surgical history      SOCIAL HISTORY:    Admitted from: Home  Patient lives at home with his wife.  Patient has 2 daughters.     Yazdanism:    Quaker                                Preferred Language: English    Surrogate/HCP/Guardian:   Barrett Castañeda (spouse)          Phone#: (c) [phone]; (h) [phone]                                          Licha Espinoza (dtr)                 Phone# [phone]    FAMILY HISTORY:  Pt unable to participate  Baseline ADLs (prior to admission): Dependent    Allergies    No Known Allergies    Intolerances      Present Symptoms:   Unable to obtain due to poor mentation    MEDICATIONS  (STANDING):  amLODIPine   Tablet 5 milliGRAM(s) Oral daily  carBAMazepine XR Tablet 200 milliGRAM(s) Oral daily  dextrose 5%. 1000 milliLiter(s) (50 mL/Hr) IV Continuous <Continuous>  dextrose 5%. 1000 milliLiter(s) (50 mL/Hr) IV Continuous <Continuous>  dextrose 50% Injectable 12.5 Gram(s) IV Push once  dextrose 50% Injectable 25 Gram(s) IV Push once  dextrose 50% Injectable 25 Gram(s) IV Push once  lamoTRIgine 50 milliGRAM(s) Oral <User Schedule>  metoprolol succinate ER 50 milliGRAM(s) Oral daily  pantoprazole  Injectable 40 milliGRAM(s) IV Push every 12 hours    MEDICATIONS  (PRN):  acetaminophen   Tablet .. 650 milliGRAM(s) Oral every 6 hours PRN Moderate Pain (4 - 6)  dextrose 40% Gel 15 Gram(s) Oral once PRN Blood Glucose LESS THAN 70 milliGRAM(s)/deciLiter  glucagon  Injectable 1 milliGRAM(s) IntraMuscular once PRN Glucose <70 milliGRAM(s)/deciLiter  traMADol 25 milliGRAM(s) Oral every 8 hours PRN Severe Pain (7 - 10)      PHYSICAL EXAM:    Vital Signs Last 24 Hrs  T(C): 36.2 (30 Sep 2019 08:05), Max: 36.8 (29 Sep 2019 23:50)  T(F): 97.2 (30 Sep 2019 08:05), Max: 98.3 (29 Sep 2019 23:50)  HR: 92 (30 Sep 2019 08:05) (71 - 92)  BP: 178/95 (30 Sep 2019 08:05) (149/72 - 178/95)  BP(mean): --  RR: 16 (30 Sep 2019 08:05) (16 - 18)  SpO2: 100% (30 Sep 2019 08:05) (96% - 100%)    General: Lethargic, alert to name,  [place], dyspnea  Karnofsky Performance Score/Palliative Performance Status Version2: 20    %    HEENT: NCAT, xerostomia, elderly   Lungs: dyspnea, on NC  CV: S1S2, RRR  GI: soft, non tender and non distended  : incontinent  Musculoskeletal:  no edema, weakness x4  Skin: mottling noted to b/l UE - tenderness and swelling of right arm  Neuro: unable to follow commands, severely cognitively impaired  Oral intake ability: poor po intake  Diet: [NPO]    LABS:                        8.9    10.58 )-----------( 68       ( 30 Sep 2019 06:31 )             29.3     09-30    145  |  118<H>  |  72<H>  ----------------------------<  141<H>  3.9   |  20<L>  |  2.95<H>    Ca    7.6<L>      30 Sep 2019 06:31  Phos  4.1     09-30  Mg     2.6     09-30    TPro  4.8<L>  /  Alb  2.2<L>  /  TBili  1.0  /  DBili  x   /  AST  33  /  ALT  19  /  AlkPhos  67  09-30        RADIOLOGY & ADDITIONAL STUDIES: Reviewed    ADVANCE DIRECTIVES: MOLST; DNR/DNI/Comfort measures only

## 2019-09-30 NOTE — PROGRESS NOTE ADULT - PROBLEM SELECTOR PLAN 5
resuming home meds- carbamazepine
-dvt ppx- no chemo ppx due to GIB  -gi ppx- cont PPI
resuming home meds- carbamazepine

## 2019-09-30 NOTE — PROGRESS NOTE ADULT - CONSTITUTIONAL DETAILS
no distress/well-developed/well-groomed
no distress/well-groomed/well-nourished/well-developed
no distress/well-groomed/well-developed

## 2019-09-30 NOTE — PROGRESS NOTE ADULT - GASTROINTESTINAL DETAILS
no rigidity/no distention/bowel sounds normal/soft/nontender/no guarding/no rebound tenderness
no guarding/bowel sounds normal/soft/nontender/no distention/no rebound tenderness/no rigidity
soft/no rebound tenderness/no rigidity/no guarding/no distention/nontender/bowel sounds normal

## 2019-09-30 NOTE — DISCHARGE NOTE PROVIDER - HOSPITAL COURSE
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 Found to have hemoglobin of 7 with +ve occult blood, transfused one unit and hemoglobin trended up to 9. 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 Found to have hemoglobin of 7 with +ve occult blood, transfused one unit and hemoglobin trended up to 9. Evaluated by GI and colonoscopy/EGD was deferred at this time due to pt's  advanced age, renal function continues to trend up     pt was made DNR/DNI, with limited medical interventions and no escalation of care as per HCP (spouse)    Pt becoming  progressively more lethargic, with mottling of upper and lower extremities, hospice referral made and pt deemed candidate for inpt hospice.    Pt can be discharged when bed available

## 2019-09-30 NOTE — GOALS OF CARE CONVERSATION - ADVANCED CARE PLANNING - WHAT MATTERS MOST
Patient is not able to express his wishes regarding medical care.  Therefore, patient's spouse (NOK) surrogate stated she does not want the patient to suffer nor does she want medical interventions that would prolong suffering.  Patient's code status is DNR/DNI, no peg and focus upon comfort and quality of life.

## 2019-09-30 NOTE — DISCHARGE NOTE PROVIDER - NSDCCPCAREPLAN_GEN_ALL_CORE_FT
PRINCIPAL DISCHARGE DIAGNOSIS  Diagnosis: GI bleed  Assessment and Plan of Treatment: GI bleed PRINCIPAL DISCHARGE DIAGNOSIS  Diagnosis: GIB (gastrointestinal bleeding)  Assessment and Plan of Treatment: Pt was found to be anemic 2/2 GIB and was transfused 1 unit prbc.  hemoglobin increased to 9 . GI was consulted and endoscopy and colonoscopy were deferred 2/2 to age and debility. Pt will be going to Inpt hospice when bed is available      SECONDARY DISCHARGE DIAGNOSES  Diagnosis: Symptomatic anemia  Assessment and Plan of Treatment: Pt has been accepted to inpt hospice @ [facility] 2/2 symptomatic anemia with progressive mottling of the skin

## 2019-09-30 NOTE — PROGRESS NOTE ADULT - PROBLEM SELECTOR PLAN 4
creatinine on admission 3.37 - was normal in 2016  likely pre-renal in setting of GI bleed and poor oral intake  f/u urine lytes  avoid nephrotoxins  US renal- increased cortical echogenicity   VBG shows pH of 7.24; bicarb 20  metabolic acidosis in setting of renal failure  f/u bmp daily
-elevated creatinine on admission 3.37, normal baseline in 2016, likely pre-renal in setting of GI bleed and poor oral intake  -avoid nephrotoxins  -US renal- increased cortical echogenicity   -no further blood work  as per HCP
creatinine on admission 3.37 - was normal in 2016  likely pre-renal in setting of GI bleed and poor oral intake  f/u urine lytes  avoid nephrotoxins  US renal- increased cortical echogenicity   VBG shows pH of 7.24; bicarb 20  metabolic acidosis in setting of renal failure  f/u bmp daily

## 2019-09-30 NOTE — PROGRESS NOTE ADULT - RESPIRATORY AND THORAX
details…
No. ROSIE screening performed.  STOP BANG Legend: 0-2 = LOW Risk; 3-4 = INTERMEDIATE Risk; 5-8 = HIGH Risk
details…
details…

## 2019-09-30 NOTE — CONSULT NOTE ADULT - PROBLEM SELECTOR RECOMMENDATION 4
Per wife pt has had significant decline in mobility.  Now bedbound with moderate cognition. High risk for skin failure.  Frequent positioning.

## 2019-09-30 NOTE — PROGRESS NOTE ADULT - NEGATIVE CARDIOVASCULAR SYMPTOMS
no orthopnea/no chest pain/no palpitations
no palpitations/no orthopnea/no chest pain
no chest pain/no palpitations/no orthopnea

## 2019-09-30 NOTE — CONSULT NOTE ADULT - PROBLEM SELECTOR RECOMMENDATION 5
Met with the patient and his wife at the bedside, pt somnolent at the time of exam.  Discussed patient's clinical status with spouse. She acknowledges understanding.  DNR/DNI on file.   She expressed noting poor po intake, and increased inability to ambulate with confusion. Mrs. Castañeda understands that pt's has increased mortality risk on this admission.  Explained the hospice philosophy, and that pt is inpatient hospice appropriate.  Mrs. Castañeda is agreeable for inpatient hospice for symptom management and end of life care.   ALVIN drafted DNR/DNI.  Comfort measures only. No further blood draws, antibiotics d/c'd.  SW referral made.   All questions answered.  Support provided.     Advanced care planning time spent 30 minutes.

## 2019-09-30 NOTE — PROGRESS NOTE ADULT - NEGATIVE GASTROINTESTINAL SYMPTOMS
no vomiting/no diarrhea/no melena/no jaundice/no nausea/no abdominal pain/no hematochezia
no nausea/no vomiting/no melena/no jaundice/no abdominal pain/no hematochezia/no diarrhea
no vomiting/no diarrhea/no hematochezia/no jaundice/no abdominal pain/no nausea/no melena

## 2019-09-30 NOTE — PROGRESS NOTE ADULT - NEGATIVE RESPIRATORY AND THORAX SYMPTOMS
no dyspnea/no wheezing/no cough/no hemoptysis
no hemoptysis/no wheezing/no cough/no dyspnea
no hemoptysis/no wheezing/no dyspnea/no cough

## 2019-09-30 NOTE — PROGRESS NOTE ADULT - PROBLEM SELECTOR PROBLEM 6
Need for prophylactic measure
Advance care planning
Need for prophylactic measure

## 2019-10-01 NOTE — CHART NOTE - NSCHARTNOTEFT_GEN_A_CORE
EVENT: Urinary retention  - Pt has not voided. Bladder scan done showed 999cc    OBJECTIVE:  Vital Signs Last 24 Hrs  T(C): 36.6 (01 Oct 2019 00:27), Max: 36.6 (01 Oct 2019 00:27)  T(F): 97.8 (01 Oct 2019 00:27), Max: 97.8 (01 Oct 2019 00:27)  HR: 57 (01 Oct 2019 00:27) (57 - 92)  BP: 163/77 (01 Oct 2019 00:27) (114/82 - 178/95)  BP(mean): --  RR: 18 (01 Oct 2019 00:27) (16 - 18)  SpO2: 90% (01 Oct 2019 00:27) (90% - 100%)    FOCUSED PHYSICAL EXAM:  Neuro: awake, alert, oriented x 3. No neuro deficit  Cardiovascular: Pulses +2 B/L in lower and upper extremities, HR regular, BP stable, No edema.  Respiratory: Respirations regular, unlabored, breath sounds clear B/L.   GI: Abdomen soft, non-tender, positive bowel sounds.  : + bladder distention noted.   Skin: Dry, intact, no bruising, no diaphoresis.    LABS:                        8.9    10.58 )-----------( 68       ( 30 Sep 2019 06:31 )             29.3     09-30    145  |  118<H>  |  72<H>  ----------------------------<  141<H>  3.9   |  20<L>  |  2.95<H>    Ca    7.6<L>      30 Sep 2019 06:31  Phos  4.1     09-30  Mg     2.6     09-30    TPro  4.8<L>  /  Alb  2.2<L>  /  TBili  1.0  /  DBili  x   /  AST  33  /  ALT  19  /  AlkPhos  67  09-30      EKG:   IMGAGING:    ASSESSMENT:  HPI:  89 y/o male from home ambulates independently and sometimes with walker, with PMHx of trigeminal neuralgia, bradycardia s/p PPM ([employer]) presents to the ED with chief complaint of lethargy and poor appetite x2 days. As per wife, patient has been eating minimally for 2 days and not as active as he normally is, prompting her to bring him to the ED via ambulance. She report she has had intermittent chest pain during this time. He denies any nausea, vomiting, melena or any other symptoms at this time. Patient reports wanting to go home and is in no acute distress.    GOC: DNR/DNI with no aggressive measures to be taken. (26 Sep 2019 17:50)      PLAN: Mills cath ordered for urinary retention

## 2019-10-01 NOTE — DISCHARGE NOTE NURSING/CASE MANAGEMENT/SOCIAL WORK - PATIENT PORTAL LINK FT
You can access the FollowMyHealth Patient Portal offered by [facility] by registering at the following website: [email or web address]. By joining [employer]’s FollowMyHealth portal, you will also be able to view your health information using other applications (apps) compatible with our system.

## 2019-10-01 NOTE — H&P ADULT - HISTORY OF PRESENT ILLNESS
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 Found to have hemoglobin of 7 with +ve occult blood, transfused one unit and hemoglobin trended up to 9. Evaluated by GI and colonoscopy/EGD was deferred at this time due to pt's  advanced age, renal function continues to trend up   pt was made DNR/DNI, with limited medical interventions and no escalation of care as per HCP (spouse)  Pt becoming  progressively more lethargic, with mottling of upper and lower extremities, hospice referral made and pt deemed candidate for inpt hospice for symptom management

## 2019-10-01 NOTE — H&P ADULT - NSHPPHYSICALEXAM_GEN_ALL_CORE
Lethargic, hard to arouse  mouth open and dry MM  no respiratory distress, no labored breathing  abd soft NTND  mas in place  GI/ incontinent  right arm edematous and tender - mottling in both arms.   weakness x4

## 2019-10-01 NOTE — PATIENT PROFILE ADULT - IS THERE A SUSPICION OF ABUSE/NEGLIGENCE?
Symptoms occurring for only 2 days. No evidence of bacterial infection. Continue with symptomatic management. Recommend increased water intake as well as saline irrigation, mucolytics, and antihistamines as needed. Advised to call back if no improvement over the next 4-7 days. no

## 2019-10-01 NOTE — GOALS OF CARE CONVERSATION - ADVANCED CARE PLANNING - CONVERSATION/DISCUSSION
Hospice Referral
Prognosis/Palliative Care Referral/Treatment Options/Hospice Referral/Diagnosis/MOLST Discussed

## 2019-10-01 NOTE — GOALS OF CARE CONVERSATION - ADVANCED CARE PLANNING - CONVERSATION DETAILS
[facility] ([facility]    Referral received from [facility] for [facility] services. Hospice services and GOC discussed with wife (Barrett) at patient's bedside and consents signed.  Patient approved for hospice inpatient level of care with Doc to Doc completed between Dr. Josefa Olmos and Silvino Fregoso NP in collaboration with Dr. Nehal Montejo.     Collaborated with ERNIE Lamb (who will write discharge orders), in addition to  Palliative Team (Dr. Josefa Olmos and   Juan Carlos Ramos NP), [address] [place] [address] Staff (Mainor, Nurse Manager, OCTAVIO Mathis, and JEFF Egan). Admitting paperwork sent to bedboard with verbal confirmation of receipt and copy of consents provided to [address] [place] [address].  Please contact N  referral center at [phone] for any questions.     Thank you for this referral.    Yaa Tripp RN, CHPN, OCN  Inpatient Hospice Specialist  [phone]
PC NP and PC SW spoke with the patient's spouse at the bedside.  Mrs. Castañeda was educated regarding the role of Palliative Care as well as advance directives including the MOLST form.  Patient's spouse stated her  would not want to prolong suffering, Therefore she is comfortable choosing to transition him to comfort measures including a referral to hospice.  Code status is DNR/ DNI, no peg and comfort measures only, as per patient's spouse.  PC Sw provided emotional support as the NP was examining the patient and coordinating care with the primary team, inc. medicine .  PC Suad learned the patient immigrated to the [place] from [place] over 50 years ago.  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  Mrs. Castañeda stated the patient played cards and enjoyed watching Chinese television programs. His primary language is Cantonese and his spiritual belief is Orthodox.  Patient and his spouse have been  for over 50 years and they have two daughters Licha and Linh.  According to , Linh resides with the patient and his wife in their private home in [place].  Patient's daughter Licha resides in [place].  Mrs. Castañeda shared she has a lot of support from her siblings who reside in the area as well as her children.  However, she stated she can no longer physically care for the patient in their home, does not have the financial resources to privately hire, and or pay out of pocket for a SNF.  Therefore, patient's spouse is agreeable to a referral for IPU at [facility].  PC Sw and NP educated  regarding the criteria for IPU stating the hospice agency needs to evaluate the patient as well.  Mrs. Castañeda verbalized understanding and repeatedly stated she wishes she  could honor the patient's wishes and bring him home, however she has not been able to mmet the needs of his rapid decline.  PC  validated the spouses' feelings of sadness and encouraged her to assure the patient he will be cared for and she will be present.  Patient's spouse was appreciative of the PC  and NP's support.  No addl needs were identified.  Mrs. Castañeda is aware of  SW's ongoing availability.

## 2019-10-02 NOTE — PROGRESS NOTE ADULT - SUBJECTIVE AND OBJECTIVE BOX
JONN, VEICHOW                    89y  Male    Allergies    No Known Allergies    Intolerances        Symptoms:  Pain (1-10):  Dyspnea:  Nausea/Vomiting:  Secretions:   Agitation:  Symptom Requiring Inpatient Hospice Admission:    Overnight events/interim history:    HPI:            MEDICATIONS  (STANDING):    MEDICATIONS  (PRN):  acetaminophen  Suppository .. 650 milliGRAM(s) Rectal every 6 hours PRN Temp greater or equal to 38C (100.4F)  bisacodyl Suppository 10 milliGRAM(s) Rectal daily PRN Constipation  glycopyrrolate Injectable 0.4 milliGRAM(s) IV Push every 4 hours PRN secretions  LORazepam   Injectable 1 milliGRAM(s) IV Push every 1 hour PRN Agitation  morphine  - Injectable 2 milliGRAM(s) IV Push every 2 hours PRN Severe Pain (7 - 10)  morphine  - Injectable 2 milliGRAM(s) IV Push every 1 hour PRN respiratory distress, labored breathing                             Vital Signs Last 24 Hrs  T(C): 36.3 (02 Oct 2019 15:40), Max: 36.5 (02 Oct 2019 08:47)  T(F): 97.3 (02 Oct 2019 15:40), Max: 97.7 (02 Oct 2019 08:47)  HR: 125 (02 Oct 2019 15:40) (67 - 154)  BP: 179/116 (02 Oct 2019 15:40) (135/84 - 179/116)  BP(mean): --  RR: 16 (02 Oct 2019 15:40) (16 - 16)  SpO2: 100% (02 Oct 2019 15:40) (90% - 100%)    General: alert  oriented x ____    [lethargic distressed cachexia  nonverbal  unarousable verbal]  Karnofsky Performance Score/Palliative Performance Status Version2:     %    HEENT: normal  dry mouth  ET tube/trach oral lesions:  Lungs: comfortable tachypnea/labored breathing  excessive secretions  CV: RRR, S1S2, tachycardia  GI: soft non distended non tender  incontinent               PEG/NG/OG tube  constipation  last BM:   : incontinent  oliguria/anuria  mas  Musculoskeletal: weakness  edema   ambulatory with assistance bedbound/wheelchair bound  Skin: normal turgor, pressure ulcer stage:   Neuro: no deficits, cognitive impairment dsyphagia/dysarthria paresis  Oral intake ability: unable/only mouth care [minimal moderate full capability]    Code Status: DNR/DNI АНДРЕЙ LUNSFORDICHOW                    89y  Male    Allergies    No Known Allergies    Intolerances        Symptoms:  Symptom Requiring Inpatient Hospice Admission: respiratory distress and pain    Overnight events/interim history: robinul and morphine x1 given each, still hard to arouse and lethargic    HPI: 89 y/o male from home ambulates independently and sometimes with walker, with PMHx of trigeminal neuralgia, 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 Found to have hemoglobin of 7 with +ve occult blood, transfused one unit and hemoglobin trended up to 9. Evaluated by GI and colonoscopy/EGD was deferred at this time due to pt's  advanced age, renal function continues to trend up   pt was made DNR/DNI, with limited medical interventions and no escalation of care as per HCP (spouse)  Pt becoming  progressively more lethargic, with mottling of upper and lower extremities, hospice referral made and pt deemed candidate for inpt hospice for symptom management       MEDICATIONS  (STANDING):    MEDICATIONS  (PRN):  acetaminophen  Suppository .. 650 milliGRAM(s) Rectal every 6 hours PRN Temp greater or equal to 38C (100.4F)  bisacodyl Suppository 10 milliGRAM(s) Rectal daily PRN Constipation  glycopyrrolate Injectable 0.4 milliGRAM(s) IV Push every 4 hours PRN secretions  LORazepam   Injectable 1 milliGRAM(s) IV Push every 1 hour PRN Agitation  morphine  - Injectable 2 milliGRAM(s) IV Push every 2 hours PRN Severe Pain (7 - 10)  morphine  - Injectable 2 milliGRAM(s) IV Push every 1 hour PRN respiratory distress, labored breathing                             Vital Signs Last 24 Hrs  T(C): 36.3 (02 Oct 2019 15:40), Max: 36.5 (02 Oct 2019 08:47)  T(F): 97.3 (02 Oct 2019 15:40), Max: 97.7 (02 Oct 2019 08:47)  HR: 125 (02 Oct 2019 15:40) (67 - 154)  BP: 179/116 (02 Oct 2019 15:40) (135/84 - 179/116)  BP(mean): --  RR: 16 (02 Oct 2019 15:40) (16 - 16)  SpO2: 100% (02 Oct 2019 15:40) (90% - 100%)    General:   Karnofsky Performance Score/Palliative Performance Status Version2: 10%     Lethargic, hard to arouse, nonverbal   mouth open and dry MM  no respiratory distress, no labored breathing  abd soft NTND  mas in place  GI/ incontinent  right arm edematous and tender - mottling in both arms.   weakness x4    Code Status: DNR/DNI

## 2019-10-02 NOTE — PROGRESS NOTE ADULT - PROBLEM SELECTOR PLAN 5
has PPM, symptomatic management only - prognosis likely days - spoke with daughter at bedside, counseling given

## 2019-10-02 NOTE — PROGRESS NOTE ADULT - PROBLEM SELECTOR PLAN 3
samantha mcmillann, pt more alert in AM and able to take a few teaspoons of ice cream but now lethargic, still keep NPO except few sips of thickened liquids/ice cream as tolerated

## 2019-10-03 NOTE — PROGRESS NOTE ADULT - SUBJECTIVE AND OBJECTIVE BOX
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 Found to have hemoglobin of 7 with +ve occult blood, transfused one unit and hemoglobin trended up to 9. Evaluated by GI and colonoscopy/EGD was deferred at this time due to pt's  advanced age, renal function continues to trend up   pt was made DNR/DNI, with limited medical interventions and no escalation of care as per HCP (spouse)  Pt becoming  progressively more lethargic, with mottling of upper and lower extremities, hospice referral made and pt deemed candidate for inpt hospice for symptom management (01 Oct 2019 14:39)            MEDICATIONS  (STANDING):    MEDICATIONS  (PRN):  acetaminophen  Suppository .. 650 milliGRAM(s) Rectal every 6 hours PRN Temp greater or equal to 38C (100.4F)  bisacodyl Suppository 10 milliGRAM(s) Rectal daily PRN Constipation  glycopyrrolate Injectable 0.4 milliGRAM(s) IV Push every 4 hours PRN secretions  LORazepam   Injectable 1 milliGRAM(s) IV Push every 1 hour PRN Agitation  morphine  - Injectable 2 milliGRAM(s) IV Push every 2 hours PRN Severe Pain (7 - 10)  morphine  - Injectable 2 milliGRAM(s) IV Push every 1 hour PRN respiratory distress, labored breathing                             Vital Signs Last 24 Hrs  T(C): 36.3 (03 Oct 2019 15:57), Max: 36.6 (03 Oct 2019 00:09)  T(F): 97.3 (03 Oct 2019 15:57), Max: 97.8 (03 Oct 2019 00:09)  HR: 77 (03 Oct 2019 15:57) (68 - 119)  BP: 158/77 (03 Oct 2019 15:57) (149/102 - 158/77)  BP(mean): --  RR: 16 (03 Oct 2019 15:57) (16 - 16)  SpO2: 100% (03 Oct 2019 15:57) (100% - 100%)    General: alert  oriented x ____    [lethargic distressed cachexia  nonverbal  unarousable verbal]  Karnofsky Performance Score/Palliative Performance Status Version2:     %    HEENT: normal  dry mouth  ET tube/trach oral lesions:  Lungs: comfortable tachypnea/labored breathing  excessive secretions  CV: RRR, S1S2, tachycardia  GI: soft non distended non tender  incontinent               PEG/NG/OG tube  constipation  last BM:   : incontinent  oliguria/anuria  mas  Musculoskeletal: weakness  edema   ambulatory with assistance bedbound/wheelchair bound  Skin: normal turgor, pressure ulcer stage:   Neuro: no deficits, cognitive impairment dsyphagia/dysarthria paresis  Oral intake ability: unable/only mouth care [minimal moderate full capability]    Code Status: DNR/DNI JONN, VEICHOW                    89y  Male    Allergies    No Known Allergies    Intolerances        Symptoms:  Symptom Requiring Inpatient Hospice Admission: respiratory distress and pain    Overnight events/interim history: more lethargic and more restlessness    HPI:  HPI: 89 y/o male from home ambulates independently and sometimes with walker, with PMHx of trigeminal neuralgia, bradycardia s/p PPM ([employer]) presents to the ED with chief complaint of lethargy and poor appetite at home admitted to tele with symptomatic anemia and elevated cardiac enzymes. Found to have hemoglobin of 7 with +ve occult blood, transfused one unit and hemoglobin trended up to 9. Evaluated by GI and colonoscopy/EGD was deferred at this time due to pt's  advanced age, renal function continues to trend up   pt was made DNR/DNI, with limited medical interventions and no escalation of care as per HCP (spouse)  Pt becoming  progressively more lethargic, with mottling of upper and lower extremities, hospice referral made and pt deemed candidate for inpt hospice for symptom management (01 Oct 2019 14:39)            MEDICATIONS  (STANDING):    MEDICATIONS  (PRN):  acetaminophen  Suppository .. 650 milliGRAM(s) Rectal every 6 hours PRN Temp greater or equal to 38C (100.4F)  bisacodyl Suppository 10 milliGRAM(s) Rectal daily PRN Constipation  glycopyrrolate Injectable 0.4 milliGRAM(s) IV Push every 4 hours PRN secretions  LORazepam   Injectable 1 milliGRAM(s) IV Push every 1 hour PRN Agitation  morphine  - Injectable 2 milliGRAM(s) IV Push every 2 hours PRN Severe Pain (7 - 10)  morphine  - Injectable 2 milliGRAM(s) IV Push every 1 hour PRN respiratory distress, labored breathing                             Vital Signs Last 24 Hrs  T(C): 36.3 (03 Oct 2019 15:57), Max: 36.6 (03 Oct 2019 00:09)  T(F): 97.3 (03 Oct 2019 15:57), Max: 97.8 (03 Oct 2019 00:09)  HR: 77 (03 Oct 2019 15:57) (68 - 119)  BP: 158/77 (03 Oct 2019 15:57) (149/102 - 158/77)  BP(mean): --  RR: 16 (03 Oct 2019 15:57) (16 - 16)  SpO2: 100% (03 Oct 2019 15:57) (100% - 100%)    neral:   Karnofsky Performance Score/Palliative Performance Status Version2: 10%     Lethargic, hard to arouse, nonverbal   mouth open and dry MM  no respiratory distress, no labored breathing  abd soft NTND  mas in place  GI/ incontinent  right arm edematous and tender - mottling in both arms.   weakness x4  NPO    Code Status: DNR/DNI

## 2019-10-03 NOTE — PROGRESS NOTE ADULT - PROBLEM SELECTOR PLAN 3
samantha mcmillann, pt more alert in AM and able to take a few teaspoons of ice cream but now lethargic, still keep NPO except few sips of thickened liquids/ice cream as tolerated robinul prn, keep NPO

## 2019-10-03 NOTE — PROGRESS NOTE ADULT - PROBLEM SELECTOR PLAN 5
has PPM, symptomatic management only - prognosis likely days - spoke with daughter at bedside, counseling given has PPM, symptomatic management only - prognosis likely days - spoke with daughter and wife at bedside, counseling given

## 2019-10-04 NOTE — PROGRESS NOTE ADULT - SUBJECTIVE AND OBJECTIVE BOX
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 Found to have hemoglobin of 7 with +ve occult blood, transfused one unit and hemoglobin trended up to 9. Evaluated by GI and colonoscopy/EGD was deferred at this time due to pt's  advanced age, renal function continues to trend up   pt was made DNR/DNI, with limited medical interventions and no escalation of care as per HCP (spouse)  Pt becoming  progressively more lethargic, with mottling of upper and lower extremities, hospice referral made and pt deemed candidate for inpt hospice for symptom management (01 Oct 2019 14:39)            MEDICATIONS  (STANDING):    MEDICATIONS  (PRN):  acetaminophen  Suppository .. 650 milliGRAM(s) Rectal every 6 hours PRN Temp greater or equal to 38C (100.4F)  bisacodyl Suppository 10 milliGRAM(s) Rectal daily PRN Constipation  glycopyrrolate Injectable 0.4 milliGRAM(s) IV Push every 4 hours PRN secretions  LORazepam   Injectable 1 milliGRAM(s) IV Push every 1 hour PRN Agitation  morphine  - Injectable 2 milliGRAM(s) IV Push every 2 hours PRN Severe Pain (7 - 10)  morphine  - Injectable 2 milliGRAM(s) IV Push every 1 hour PRN respiratory distress, labored breathing                             Vital Signs Last 24 Hrs  T(C): 36.9 (04 Oct 2019 16:09), Max: 37.6 (04 Oct 2019 13:28)  T(F): 98.4 (04 Oct 2019 16:09), Max: 99.6 (04 Oct 2019 13:28)  HR: 155 (04 Oct 2019 16:09) (69 - 155)  BP: 147/82 (04 Oct 2019 16:09) (144/69 - 156/86)  BP(mean): --  RR: 17 (04 Oct 2019 16:09) (16 - 17)  SpO2: 100% (04 Oct 2019 16:09) (100% - 100%)    General: alert  oriented x ____    [lethargic distressed cachexia  nonverbal  unarousable verbal]  Karnofsky Performance Score/Palliative Performance Status Version2:     %    HEENT: normal  dry mouth  ET tube/trach oral lesions:  Lungs: comfortable tachypnea/labored breathing  excessive secretions  CV: RRR, S1S2, tachycardia  GI: soft non distended non tender  incontinent               PEG/NG/OG tube  constipation  last BM:   : incontinent  oliguria/anuria  mas  Musculoskeletal: weakness  edema   ambulatory with assistance bedbound/wheelchair bound  Skin: normal turgor, pressure ulcer stage:   Neuro: no deficits, cognitive impairment dsyphagia/dysarthria paresis  Oral intake ability: unable/only mouth care [minimal moderate full capability]    Code Status: DNR/DNI JONN, VEICHOW                    89y  Male    Allergies    No Known Allergies    Intolerances        Symptoms:  Symptom Requiring Inpatient Hospice Admission: respiratory distress and pain    Overnight events/interim history: unarousable now, more mottling in LEs and arms    HPI:  HPI: 89 y/o male from home ambulates independently and sometimes with walker, with PMHx of trigeminal neuralgia, 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 Found to have hemoglobin of 7 with +ve occult blood, transfused one unit and hemoglobin trended up to 9. Evaluated by GI and colonoscopy/EGD was deferred at this time due to pt's  advanced age, renal function continues to trend up   pt was made DNR/DNI, with limited medical interventions and no escalation of care as per HCP (spouse)  Pt becoming  progressively more lethargic, with mottling of upper and lower extremities, hospice referral made and pt deemed candidate for inpt hospice for symptom management (01 Oct 2019 14:39)            MEDICATIONS  (STANDING):    MEDICATIONS  (PRN):  acetaminophen  Suppository .. 650 milliGRAM(s) Rectal every 6 hours PRN Temp greater or equal to 38C (100.4F)  bisacodyl Suppository 10 milliGRAM(s) Rectal daily PRN Constipation  glycopyrrolate Injectable 0.4 milliGRAM(s) IV Push every 4 hours PRN secretions  LORazepam   Injectable 1 milliGRAM(s) IV Push every 1 hour PRN Agitation  morphine  - Injectable 2 milliGRAM(s) IV Push every 2 hours PRN Severe Pain (7 - 10)  morphine  - Injectable 2 milliGRAM(s) IV Push every 1 hour PRN respiratory distress, labored breathing                             Vital Signs Last 24 Hrs  T(C): 36.9 (04 Oct 2019 16:09), Max: 37.6 (04 Oct 2019 13:28)  T(F): 98.4 (04 Oct 2019 16:09), Max: 99.6 (04 Oct 2019 13:28)  HR: 155 (04 Oct 2019 16:09) (69 - 155)  BP: 147/82 (04 Oct 2019 16:09) (144/69 - 156/86)  BP(mean): --  RR: 17 (04 Oct 2019 16:09) (16 - 17)  SpO2: 100% (04 Oct 2019 16:09) (100% - 100%)    General:   Karnofsky Performance Score/Palliative Performance Status Version2: 10%    unarousable, nonverbal   mouth open and dry MM  no respiratory distress, no labored breathing  abd soft NTND  mas in place  GI/ incontinent  right arm edematous and tender - mottling in both arms and LEs  weakness x4  NPO    Code Status: DNR/DNI

## 2019-10-04 NOTE — PROGRESS NOTE ADULT - PROBLEM SELECTOR PLAN 5
has PPM, symptomatic management only - prognosis likely days - spoke with daughter at bedside, counseling given has PPM, symptomatic management only - prognosis likely hours to days- spoke with daughter and wife at bedside, counseling given

## 2019-10-04 NOTE — PROGRESS NOTE ADULT - PROBLEM SELECTOR PLAN 3
samantha mcmillann, pt more alert in AM and able to take a few teaspoons of ice cream but now lethargic, still keep NPO except few sips of thickened liquids/ice cream as tolerated samantha mcclendon, NPO

## 2019-10-05 NOTE — PROGRESS NOTE ADULT - PROBLEM SELECTOR PLAN 6
has PPM, symptomatic management only - prognosis likely days. family at bedside, they have good understanding of poor prognosis.

## 2019-10-05 NOTE — PROGRESS NOTE ADULT - PROBLEM SELECTOR PROBLEM 4
Gastrointestinal hemorrhage, unspecified gastrointestinal hemorrhage type
Gastrointestinal hemorrhage, unspecified gastrointestinal hemorrhage type
Functional quadriplegia
Gastrointestinal hemorrhage, unspecified gastrointestinal hemorrhage type

## 2019-10-05 NOTE — PROGRESS NOTE ADULT - PROBLEM SELECTOR PLAN 2
morphine prn, bowel regimen PRN for constipation

## 2019-10-05 NOTE — PROGRESS NOTE ADULT - PROBLEM SELECTOR PROBLEM 3
Increased oropharyngeal secretions

## 2019-10-05 NOTE — PROGRESS NOTE ADULT - SUBJECTIVE AND OBJECTIVE BOX
INTERVAL HPI/OVERNIGHT EVENTS:    Patient comfortably in bed. Family at bedside, no complaints endorsed    Code Status: DNR/DNI  Allergies    No Known Allergies    Intolerances    MEDICATIONS  (STANDING):    MEDICATIONS  (PRN):  acetaminophen  Suppository .. 650 milliGRAM(s) Rectal every 6 hours PRN Temp greater or equal to 38C (100.4F)  bisacodyl Suppository 10 milliGRAM(s) Rectal daily PRN Constipation  glycopyrrolate Injectable 0.4 milliGRAM(s) IV Push every 4 hours PRN secretions  LORazepam   Injectable 1 milliGRAM(s) IV Push every 1 hour PRN Agitation  morphine  - Injectable 2 milliGRAM(s) IV Push every 2 hours PRN Severe Pain (7 - 10)  morphine  - Injectable 2 milliGRAM(s) IV Push every 1 hour PRN respiratory distress, labored breathing      PRESENT SYMPTOMS: [x ]Unable to obtain due to poor mentation   Source if other than patient:  [ ]Family   [ ]Team     Pain (Impact on QOL):    Location:  Severity:  Minimal acceptable level (0-10 scale):       Quality:       Onset:  Duration:  Aggravating factors:  Relieving Factors  Radiation:    Dyspnea:  Yes [ ] No [ ] - [ ]Mild [ ]Moderate [ ]Severe  Anxiety:    Yes [ ] No [ ] - [ ]Mild [ ]Moderate [ ]Severe  Fatigue:    Yes [ ] No [ ] - [ ]Mild [ ]Moderate [ ]Severe  Nausea:    Yes [ ] No [ ] - [ ]Mild [ ]Moderate [ ]Severe                         Loss of appetite: Yes [ ] No [ ] - [ ]Mild [ ]Moderate [ ]Severe             Constipation:  Yes [ ] No [ ] - [ ]Mild [ ]Moderate [ ]Severe  Grief:  Yes [ ] No [ ]     PAIN AD Score:	  http://geriatrictoolkit.[email or web address]/painad.[email or web address] (Ctrl + left click to view)    Other Symptoms:  [ ]All other review of systems negative     Karnofsky Performance Score/Palliative Performance Status Version 2:       10  %    http://palliative.info/resource_material/PPSv2.pdf    PHYSICAL EXAM:  Vital Signs Last 24 Hrs  T(C): 36.3 (05 Oct 2019 07:55), Max: 37.9 (04 Oct 2019 22:34)  T(F): 97.4 (05 Oct 2019 07:55), Max: 100.2 (04 Oct 2019 22:34)  HR: 156 (05 Oct 2019 07:55) (112 - 156)  BP: 143/82 (05 Oct 2019 07:55) (143/82 - 151/87)  BP(mean): --  RR: 16 (05 Oct 2019 07:55) (16 - 17)  SpO2: 98% (05 Oct 2019 07:55) (94% - 100%) I&O's Summary    04 Oct 2019 07:01  -  05 Oct 2019 07:00  --------------------------------------------------------  IN: 0 mL / OUT: 350 mL / NET: -350 mL     GENERAL:  [ ]Alert  [ ]Oriented x   [ x]Lethargic  [ ]Cachexia  [ ]Unarousable  [ ]Verbal  [ ]Non-Verbal  Behavioral:   [ ] Anxiety  [ ] Delirium [ ] Agitation [x ] Other  HEENT:  [ ]Normal   [ x]Dry mouth   [ ]ET Tube/Trach  [ ]Oral lesions  PULMONARY:   [ ]Clear [ ]Tachypnea  [ ]Audible excessive secretions   [ ]Rhonchi        [ ]Right [ ]Left [ ]Bilateral  [ ]Crackles        [ ]Right [ ]Left [ ]Bilateral  [ ]Wheezing     [ ]Right [ ]Left [ ]Bilateral  CARDIOVASCULAR:    [ ]Regular [ ]Irregular [x ]Tachy  [ ]Ángel [ ]Murmur [ ]Other  GASTROINTESTINAL:  [x]Soft  [ ]Distended   [ ]+BS  [x ]Non tender [ ]Tender  [ ]PEG [ ]OGT/ NGT   Last BM:   GENITOURINARY:  [ ]Normal [ ] Incontinent   [ ]Oliguria/Anuria   [x ]Mills  MUSCULOSKELETAL:   [ ]Normal   [ ]Weakness  [x ]Bed/Wheelchair bound [ ]Edema  NEUROLOGIC:   [ ]No focal deficits  [x ] Cognitive impairment  [ ] Dysphagia [ ]Dysarthria [ ] Paresis [ ]Other   SKIN:   [x ]Normal   [ ]Pressure ulcer(s)  [ ]Rash    CRITICAL CARE:  [ ] Shock Present  [ ]Septic [ ]Cardiogenic [ ]Neurologic [ ]Hypovolemic  [ ]  Vasopressors [ ]  Inotropes   [ ] Respiratory failure present  [ ] Acute  [ ] Chronic [ ] Hypoxic  [ ] Hypercarbic [ ] Other  [ ] Other organ failure     LABS:      RADIOLOGY & ADDITIONAL STUDIES:    Protein Calorie Malnutrition Present: [ ] yes [ ] no  [ ] PPSV2 < or = 30%  [ ] significant weight loss [ ] poor nutritional intake [ ] anasarca [ ] catabolic state Albumin, Serum: 2.2 g/dL (09-30-19 @ 06:31)      REFERRALS:   [ ]Chaplaincy  [ ] Hospice  [ ]Child Life  [ ]Social Work  [ ]Case management [ ]Holistic Therapy   Goals of Care Document:

## 2019-10-05 NOTE — PROGRESS NOTE ADULT - PROBLEM SELECTOR PLAN 1
responding to morphine - 2mg IVP morphine PRN, NC

## 2019-10-05 NOTE — PROGRESS NOTE ADULT - PROBLEM SELECTOR PLAN 4
no further bloodwork or blood transfusions
no further bloodwork or blood transfusions
PPS 10%. Skin care PRN. Full assist with ADL's.
no further bloodwork or blood transfusions

## 2019-10-05 NOTE — PROGRESS NOTE ADULT - PROBLEM SELECTOR PLAN 7
Daughter and wife at bedside. They endorses that patient is sleeping more but is more comfortable. Emotional support provided. Patient continues to have needs for inpatient level of hospice care.

## 2019-10-05 NOTE — PROGRESS NOTE ADULT - PROBLEM SELECTOR PROBLEM 5
Severe pulmonary arterial systolic hypertension
Severe pulmonary arterial systolic hypertension
Gastrointestinal hemorrhage, unspecified gastrointestinal hemorrhage type
Severe pulmonary arterial systolic hypertension

## 2019-10-05 NOTE — DISCHARGE NOTE FOR THE EXPIRED PATIENT - SECONDARY DIAGNOSIS.
Severe pulmonary arterial systolic hypertension Gastrointestinal hemorrhage, unspecified gastrointestinal hemorrhage type

## 2019-10-05 NOTE — PROGRESS NOTE ADULT - ASSESSMENT
89 year old man with Respiratory distress, pain, increased oropharyngeal secretions, Functional quadriplegia, Gastrointestinal hemorrhage, Severe pulmonary arterial systolic hypertension, and encounter for hospice care.

## 2019-10-05 NOTE — PROGRESS NOTE ADULT - REASON FOR ADMISSION
respiratory distress/pain

## 2019-10-05 NOTE — DISCHARGE NOTE FOR THE EXPIRED PATIENT - OTHER SIGNIFICANT FINDINGS
Organ Donation notified of patient's expiration. Patient is not a candidate for donation due to his age but will be referred to the [facility] for research.

## 2019-11-12 PROCEDURE — 80048 BASIC METABOLIC PNL TOTAL CA: CPT

## 2019-11-12 PROCEDURE — 84300 ASSAY OF URINE SODIUM: CPT

## 2019-11-12 PROCEDURE — 86901 BLOOD TYPING SEROLOGIC RH(D): CPT

## 2019-11-12 PROCEDURE — 36415 COLL VENOUS BLD VENIPUNCTURE: CPT

## 2019-11-12 PROCEDURE — 86923 COMPATIBILITY TEST ELECTRIC: CPT

## 2019-11-12 PROCEDURE — 85027 COMPLETE CBC AUTOMATED: CPT

## 2019-11-12 PROCEDURE — 86850 RBC ANTIBODY SCREEN: CPT

## 2019-11-12 PROCEDURE — 84156 ASSAY OF PROTEIN URINE: CPT

## 2019-11-12 PROCEDURE — 70450 CT HEAD/BRAIN W/O DYE: CPT

## 2019-11-12 PROCEDURE — 83935 ASSAY OF URINE OSMOLALITY: CPT

## 2019-11-12 PROCEDURE — 82550 ASSAY OF CK (CPK): CPT

## 2019-11-12 PROCEDURE — 82962 GLUCOSE BLOOD TEST: CPT

## 2019-11-12 PROCEDURE — 84466 ASSAY OF TRANSFERRIN: CPT

## 2019-11-12 PROCEDURE — 80061 LIPID PANEL: CPT

## 2019-11-12 PROCEDURE — 71045 X-RAY EXAM CHEST 1 VIEW: CPT

## 2019-11-12 PROCEDURE — 84100 ASSAY OF PHOSPHORUS: CPT

## 2019-11-12 PROCEDURE — 82746 ASSAY OF FOLIC ACID SERUM: CPT

## 2019-11-12 PROCEDURE — 81001 URINALYSIS AUTO W/SCOPE: CPT

## 2019-11-12 PROCEDURE — 83540 ASSAY OF IRON: CPT

## 2019-11-12 PROCEDURE — 83605 ASSAY OF LACTIC ACID: CPT

## 2019-11-12 PROCEDURE — 36430 TRANSFUSION BLD/BLD COMPNT: CPT

## 2019-11-12 PROCEDURE — 86900 BLOOD TYPING SEROLOGIC ABO: CPT

## 2019-11-12 PROCEDURE — 82272 OCCULT BLD FECES 1-3 TESTS: CPT

## 2019-11-12 PROCEDURE — 99285 EMERGENCY DEPT VISIT HI MDM: CPT | Mod: 25

## 2019-11-12 PROCEDURE — 84484 ASSAY OF TROPONIN QUANT: CPT

## 2019-11-12 PROCEDURE — 87086 URINE CULTURE/COLONY COUNT: CPT

## 2019-11-12 PROCEDURE — 82553 CREATINE MB FRACTION: CPT

## 2019-11-12 PROCEDURE — P9040: CPT

## 2019-11-12 PROCEDURE — 93306 TTE W/DOPPLER COMPLETE: CPT

## 2019-11-12 PROCEDURE — 82803 BLOOD GASES ANY COMBINATION: CPT

## 2019-11-12 PROCEDURE — 83036 HEMOGLOBIN GLYCOSYLATED A1C: CPT

## 2019-11-12 PROCEDURE — 82607 VITAMIN B-12: CPT

## 2019-11-12 PROCEDURE — 83550 IRON BINDING TEST: CPT

## 2019-11-12 PROCEDURE — 82728 ASSAY OF FERRITIN: CPT

## 2019-11-12 PROCEDURE — 93005 ELECTROCARDIOGRAM TRACING: CPT

## 2019-11-12 PROCEDURE — 80053 COMPREHEN METABOLIC PANEL: CPT

## 2019-11-12 PROCEDURE — 82570 ASSAY OF URINE CREATININE: CPT

## 2019-11-12 PROCEDURE — 76775 US EXAM ABDO BACK WALL LIM: CPT

## 2019-11-12 PROCEDURE — 87040 BLOOD CULTURE FOR BACTERIA: CPT

## 2019-11-12 PROCEDURE — 83735 ASSAY OF MAGNESIUM: CPT

## 2019-11-12 PROCEDURE — 84443 ASSAY THYROID STIM HORMONE: CPT

## 2021-05-20 NOTE — PROGRESS NOTE ADULT - NEGATIVE SKIN SYMPTOMS
no dryness/no rash/no itching
no itching/no dryness/no rash
no dryness/no itching/no rash
normal balance

## 2022-04-07 NOTE — H&P ADULT - NSTOBACCOSCREENHP_GEN_A_CS
OPERATIVE REPORT  PATIENT NAME: Jonathan Khan    :  1963  MRN: [id number]  Pt Location:  GI ROOM 01    SURGERY DATE: 2022    Surgeon(s) and Role: Oleg Song MD - Primary    Preop Diagnosis:  Cervical spondylosis [M47 812]    Post-Op Diagnosis Codes:     * Cervical spondylosis [M47 812]    Procedure(s) (LRB):  Right C3, C4, C5 RFA (Right)    Specimen(s):  * No specimens in log *    Estimated Blood Loss:   Minimal    Drains:  * No LDAs found *    Anesthesia Type:   Local    Operative Indications:  Cervical spondylosis [M47 812]    Operative Findings:  Right C3, C4, C5 medial branch nerve regions identified under fluoroscopic guidance  Appropriate motor testing performed prior to radiofrequency lesioning of medial branch nerves      Complications:   None    Procedure and Technique:  Please see detailed procedure note     I was present for the entire procedure    Patient Disposition:  PACU       SIGNATURE: Mulu Rodríguez MD  DATE: 2022  TIME: 10:02 AM No

## 2025-04-09 NOTE — ED PROVIDER NOTE - DURATION
1435 Empty drain 65ml    1440 Pt get dress with nurse    1450 Family at bedside/ discharge instruction reviewed with pt and family by nurse    1500 IV removed   today
